# Patient Record
Sex: FEMALE | Race: WHITE | NOT HISPANIC OR LATINO | Employment: PART TIME | URBAN - METROPOLITAN AREA
[De-identification: names, ages, dates, MRNs, and addresses within clinical notes are randomized per-mention and may not be internally consistent; named-entity substitution may affect disease eponyms.]

---

## 2017-02-03 ENCOUNTER — ALLSCRIPTS OFFICE VISIT (OUTPATIENT)
Dept: OTHER | Facility: OTHER | Age: 47
End: 2017-02-03

## 2017-02-03 DIAGNOSIS — Z00.00 ENCOUNTER FOR GENERAL ADULT MEDICAL EXAMINATION WITHOUT ABNORMAL FINDINGS: ICD-10-CM

## 2017-03-17 ENCOUNTER — HOSPITAL ENCOUNTER (OUTPATIENT)
Dept: BONE DENSITY | Facility: IMAGING CENTER | Age: 47
Discharge: HOME | End: 2017-03-17
Payer: COMMERCIAL

## 2017-03-17 ENCOUNTER — GENERIC CONVERSION - ENCOUNTER (OUTPATIENT)
Dept: OTHER | Facility: OTHER | Age: 47
End: 2017-03-17

## 2017-03-17 DIAGNOSIS — Z00.00 ENCOUNTER FOR GENERAL ADULT MEDICAL EXAMINATION WITHOUT ABNORMAL FINDINGS: ICD-10-CM

## 2017-03-17 PROCEDURE — G0202 SCR MAMMO BI INCL CAD: HCPCS

## 2017-10-11 ENCOUNTER — GENERIC CONVERSION - ENCOUNTER (OUTPATIENT)
Dept: OTHER | Facility: OTHER | Age: 47
End: 2017-10-11

## 2017-10-11 ENCOUNTER — ALLSCRIPTS OFFICE VISIT (OUTPATIENT)
Dept: OTHER | Facility: OTHER | Age: 47
End: 2017-10-11

## 2017-10-11 DIAGNOSIS — R10.13 EPIGASTRIC PAIN: ICD-10-CM

## 2017-10-11 DIAGNOSIS — R74.8 ABNORMAL LEVELS OF OTHER SERUM ENZYMES: ICD-10-CM

## 2017-10-30 ENCOUNTER — HOSPITAL ENCOUNTER (OUTPATIENT)
Dept: RADIOLOGY | Facility: HOSPITAL | Age: 47
Discharge: HOME | End: 2017-10-30
Payer: COMMERCIAL

## 2017-10-30 DIAGNOSIS — R10.13 EPIGASTRIC PAIN: ICD-10-CM

## 2017-10-30 LAB
A/G RATIO (HISTORICAL): 2 (CALC) (ref 1–2.5)
ALBUMIN SERPL BCP-MCNC: 4.4 G/DL (ref 3.6–5.1)
ALP SERPL-CCNC: 69 U/L (ref 33–115)
ALT SERPL W P-5'-P-CCNC: 41 U/L (ref 6–29)
AST SERPL W P-5'-P-CCNC: 33 U/L (ref 10–35)
BILIRUB SERPL-MCNC: 0.3 MG/DL (ref 0.2–1.2)
BILIRUBIN DIRECT (HISTORICAL): 0.1 MG/DL
GAMMA GLOBULIN (HISTORICAL): 2.2 G/DL (CALC) (ref 1.9–3.7)
GGT (HISTORICAL): 124 U/L (ref 3–55)
INDIRECT BILIRUBIN (HISTORICAL): 0.2 MG/DL (CALC) (ref 0.2–1.2)
TOTAL PROTEIN (HISTORICAL): 6.6 G/DL (ref 6.1–8.1)

## 2017-10-30 PROCEDURE — 76705 ECHO EXAM OF ABDOMEN: CPT

## 2017-11-10 ENCOUNTER — GENERIC CONVERSION - ENCOUNTER (OUTPATIENT)
Dept: OTHER | Facility: OTHER | Age: 47
End: 2017-11-10

## 2018-01-12 NOTE — RESULT NOTES
Verified Results  (1) HEPATIC FUNCTION PANEL 30Oct2017 08:03AM Bernardino Lynch   REPORT COMMENT:  FASTING:YES     Test Name Result Flag Reference   PROTEIN, TOTAL 6 6 g/dL  6 1-8 1   ALBUMIN 4 4 g/dL  3 6-5 1   GLOBULIN 2 2 g/dL (calc)  1 9-3 7   ALBUMIN/GLOBULIN RATIO 2 0 (calc)  1 0-2 5   BILIRUBIN, TOTAL 0 3 mg/dL  0 2-1 2   BILIRUBIN, DIRECT 0 1 mg/dL  < OR = 0 2   BILIRUBIN, INDIRECT 0 2 mg/dL (calc)  0 2-1 2   ALKALINE PHOSPHATASE 69 U/L     AST 33 U/L  10-35   ALT 41 U/L H 6-29     (1) GGT [id number] 08:03AM Bernardino Lynch     Test Name Result Flag Reference    U/L H 3-55     US LIVER 30Oct2017 07:10AM Jayshree Fregoso Order Number: [id number]    - Patient Instructions: To schedule this appointment, please contact Central Scheduling at [phone]  Test Name Result Flag Reference   US LIVER (Report)     RIGHT UPPER QUADRANT ULTRASOUND     INDICATION: Elevated liver enzymes  COMPARISON: None     TECHNIQUE:  Real-time ultrasound of the right upper quadrant was performed with a curvilinear transducer with both volumetric sweeps and still imaging techniques  FINDINGS:     PANCREAS: Visualized portions of the pancreas are within normal limits  AORTA AND IVC: Visualized portions are normal for patient age  LIVER:   Size: Within normal range  The liver measures 13 2 cm in the midclavicular line  Contour: Surface contour is smooth  Parenchyma: Echogenicity and echotexture are within normal limits  No evidence of suspicious mass  Limited imaging of the main portal vein shows it to be patent and hepatopetal       BILIARY:   The gallbladder is normal in caliber  No wall thickening or pericholecystic fluid  No stones or sludge identified  There is a 6 mm polyp seen  No sonographic Israel's sign  No intrahepatic biliary dilatation  CBD measures 4 mm  No choledocholithiasis  KIDNEY:    Right kidney measures 12 1 x 5 0 cm  Within normal limits       ASCITES: None        IMPRESSION:     6 mm gallbladder polyp  According to literature guidelines (Gerardine Fleischer 2013;10:953-956) small polyps this size are benign and no workup or followup is needed  Otherwise, unremarkable right upper quadrant ultrasound  Workstation performed: [id number]     Signed by:   Samia Nieves DO   10/31/17       Plan  Abnormal GGT test    · (1) GGT; Status:Active; Requested for:[id number];    · (1) HEPATIC FUNCTION PANEL; Status:Active;  Requested for:[id number];

## 2018-01-14 NOTE — RESULT NOTES
Verified Results  * MAMMO SCREENING BILATERAL W CAD [id number] 01:39PM Jose Raul Gilliam Order Number: [id number]    - Patient Instructions: To schedule this appointment, please contact Central Scheduling at [phone]  Do not wear any perfume, powder, lotion or deodorant on breast or underarm area  Please bring your doctors order, referral (if needed) and insurance information with you on the day of the test  Failure to bring this information may result in this test being rescheduled  Arrive 15 minutes prior to your appointment time to register  On the day of your test, please bring any prior mammogram or breast studies with you that were not performed at a [facility]  Failure to bring prior exams may result in your test needing to be rescheduled  Test Name Result Flag Reference   MAMMO SCREENING BILATERAL W CAD (Report)     Patient History:   Patient has history of other cancer at age 50  Family history of prostate cancer at age 79 in paternal    grandfather  Took hormonal contraceptives for 5 years  Patient has never smoked  Patient's BMI is 30 9  Reason for exam: screening, asymptomatic  Mammo Screening Bilateral W CAD: March 17, 2017 - Check In #:    [de-identified]   Bilateral MLO, CC, and XCCL view(s) were taken  Technologist: RERE Carpenter (R)(M)   Prior study comparison: May 22, 2015, bilateral OPMA digital scrn   mammo w/CAD performed at [address] [place] [address]  The breast tissue is heterogeneously dense, potentially limiting    the sensitivity of mammography  Patient risk, included in this    report, assists in determining the appropriate screening regimen    (such as 3-D mammography or the inclusion of automated breast    ultrasound or MRI)  3-D mammography may also remain indicated as    screening  The parenchymal pattern appears stable  No dominant soft tissue    mass or suspicious calcifications are noted   The skin and nipple contours are within normal limits  No mammographic evidence of malignancy  No    significant changes when compared with prior studies  ACR BI-RADSï¾® Assessments: BiRad:1 - Negative     Recommendation:   Routine screening mammogram in 1 year  A reminder letter will be   scheduled  Analyzed by CAD     8-10% of cancers will be missed on mammography  Management of a    palpable abnormality must be based on clinical grounds  Patients   will be notified of their results via letter from our facility  Accredited by [employer] of [employer] and FDA       Transcription Location: [facility] 98: [id number]     Risk Value(s):   Tyrer-Cuzick 10 Year: 2 200%, Tyrer-Cuzick Lifetime: 10 600%,    Myriad Table: 1 5%, RIP 5 Year: 1 0%, NCI Lifetime: 10 2%   Signed by:   Moshe Elizondo MD   3/17/17

## 2018-01-15 VITALS
BODY MASS INDEX: 30.16 KG/M2 | SYSTOLIC BLOOD PRESSURE: 120 MMHG | HEIGHT: 65 IN | TEMPERATURE: 99.2 F | WEIGHT: 181 LBS | DIASTOLIC BLOOD PRESSURE: 80 MMHG

## 2018-01-22 VITALS
WEIGHT: 182 LBS | DIASTOLIC BLOOD PRESSURE: 70 MMHG | HEIGHT: 65 IN | SYSTOLIC BLOOD PRESSURE: 126 MMHG | BODY MASS INDEX: 30.32 KG/M2

## 2018-02-05 DIAGNOSIS — M54.2 CERVICALGIA: Primary | ICD-10-CM

## 2018-02-05 RX ORDER — ALPRAZOLAM 0.25 MG/1
TABLET ORAL
COMMUNITY
Start: 2016-03-04 | End: 2018-03-30 | Stop reason: ALTCHOICE

## 2018-02-05 RX ORDER — CYCLOBENZAPRINE HCL 10 MG
1 TABLET ORAL 3 TIMES DAILY PRN
COMMUNITY
Start: 2011-11-10 | End: 2018-02-05 | Stop reason: SDUPTHER

## 2018-02-05 RX ORDER — CYCLOBENZAPRINE HCL 10 MG
10 TABLET ORAL 3 TIMES DAILY PRN
Qty: 90 TABLET | Refills: 1 | Status: SHIPPED | OUTPATIENT
Start: 2018-02-05 | End: 2018-04-26 | Stop reason: SDUPTHER

## 2018-02-05 RX ORDER — NABUMETONE 750 MG/1
1 TABLET, FILM COATED ORAL 2 TIMES DAILY PRN
COMMUNITY
Start: 2014-12-18 | End: 2018-09-18 | Stop reason: SDUPTHER

## 2018-03-30 ENCOUNTER — OFFICE VISIT (OUTPATIENT)
Dept: FAMILY MEDICINE CLINIC | Facility: CLINIC | Age: 48
End: 2018-03-30
Payer: COMMERCIAL

## 2018-03-30 VITALS
WEIGHT: 182 LBS | DIASTOLIC BLOOD PRESSURE: 80 MMHG | BODY MASS INDEX: 31.07 KG/M2 | HEIGHT: 64 IN | SYSTOLIC BLOOD PRESSURE: 118 MMHG

## 2018-03-30 DIAGNOSIS — M79.661 RIGHT CALF PAIN: Primary | ICD-10-CM

## 2018-03-30 DIAGNOSIS — C43.62 MALIGNANT MELANOMA OF LEFT UPPER EXTREMITY INCLUDING SHOULDER (HCC): ICD-10-CM

## 2018-03-30 DIAGNOSIS — R74.8 ABNORMAL GGT TEST: ICD-10-CM

## 2018-03-30 DIAGNOSIS — F41.9 ANXIETY: ICD-10-CM

## 2018-03-30 PROBLEM — R10.13 EPIGASTRIC PAIN: Status: ACTIVE | Noted: 2017-10-11

## 2018-03-30 PROCEDURE — 99214 OFFICE O/P EST MOD 30 MIN: CPT | Performed by: FAMILY MEDICINE

## 2018-03-30 NOTE — ASSESSMENT & PLAN NOTE
Patient has had some intermittent right calf pain for approximately 1 week  The pain is not persistent  Blue sign is negative and there is no cord palpable  She does have some mild tenderness in a deep medial calf  She has no erythema and no edema  She has no popliteal findings, no posterior thigh findings and no chest pain shortness of breath X cetera  I told her that I suspect that it is a muscular strain  She has no findings that would suggest that she has DVT  I did note to her that clinical examination is not 100% effective in detecting DVT  I did offer her noninvasive testing to rule is out today  She states that she prefer to wait over the weekend  If she continues to have symptoms or certainly if the increase she will call on Monday so that we can arrange for an ultrasound of the calf for her  She will seek more urgent medical attention if she would develop chest pain shortness of breath X cetera which I believe is highly unlikely  She is in agreement with this approach

## 2018-03-30 NOTE — PROGRESS NOTES
Assessment/Plan:  Melanoma ([facility] [address] )  Dr Ana Rosa Ford following  Right calf pain  Patient has had some intermittent right calf pain for approximately 1 week  The pain is not persistent  Blue sign is negative and there is no cord palpable  She does have some mild tenderness in a deep medial calf  She has no erythema and no edema  She has no popliteal findings, no posterior thigh findings and no chest pain shortness of breath X cetera  I told her that I suspect that it is a muscular strain  She has no findings that would suggest that she has DVT  I did note to her that clinical examination is not 100% effective in detecting DVT  I did offer her noninvasive testing to rule is out today  She states that she prefer to wait over the weekend  If she continues to have symptoms or certainly if the increase she will call on Monday so that we can arrange for an ultrasound of the calf for her  She will seek more urgent medical attention if she would develop chest pain shortness of breath X cetera which I believe is highly unlikely  She is in agreement with this approach  Abnormal GGT test  She has LFT abnormality by history  She has cut back on alcohol as well as anti-inflammatories and cyclobenzaprine  We are going to have her go for repeat liver function tests follow up with her when results are available  She agrees  Neck pain  Patient has chronic myofascial neck pain  She continues with cyclobenzaprine and Relafen  Diagnoses and all orders for this visit:    Right calf pain  -     VAS lower limb venous duplex study, unilateral/limited; Future    Abnormal GGT test    Anxiety    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)          Subjective:   Chief Complaint   Patient presents with    Leg Pain     Right lower  (Calf)   About a week of R calf pain described as a bruise  No lump  No trauma  No new activity  No edema, CP or Sob, fever,   Due for LFTs for elevation   Less ETOH, probiotis as well as less cyclobenzaprine and Relafen  Patient ID: Melissa Xie is a 50 y o  female  HPI  The patient is a 53-year-old female who is had right calf pain for approximately 1 week  She describes it is a bruised feeling  She has had no palpable mass and she recalls no trauma  She has had no overuse activity  She has no edema no erythema and no chest pain shortness of breath, fever, presyncope X cetera  She also states that she is due for her liver function repeat  She has been drinking less alcohol taking less cyclobenzaprine and Relafen and trying to take a cleansing mixture that she obtained from the [place]  The following portions of the patient's history were reviewed and updated as appropriate: allergies, current medications, past family history, past medical history, past social history, past surgical history and problem list     Review of Systems   Constitution: Negative for decreased appetite, fever, weight gain and weight loss  Cardiovascular: Negative for chest pain, irregular heartbeat and leg swelling  Respiratory: Negative for shortness of breath and wheezing  Skin: Positive for skin cancer  Negative for rash  She has history of melanoma resection from left upper lateral arm  Musculoskeletal: Positive for myalgias  Negative for back pain and muscle cramps  Calf pain as noted in HPI  No radicular symptoms  Objective:    Physical Exam   Constitutional: She is oriented to person, place, and time  She appears well-developed and well-nourished  Neck: No JVD present  Cardiovascular: Normal rate, regular rhythm and normal heart sounds  Exam reveals no gallop  No murmur heard  Pulmonary/Chest: Effort normal and breath sounds normal  No respiratory distress  She has no wheezes  She has no rales  Musculoskeletal: She exhibits no edema  Calf diameter is essentially equal bilaterally  Her right calf is 18 cm, left calf 18-1/4 cm  There is no palpable cord, she has no Blue sign though there is some mild medial deep tenderness  There is no erythema, popliteal fossa and thigh are normal    Neurological: She is alert and oriented to person, place, and time  Skin: No rash noted  No erythema  Psychiatric: She has a normal mood and affect   Her behavior is normal

## 2018-03-30 NOTE — ASSESSMENT & PLAN NOTE
She has LFT abnormality by history  She has cut back on alcohol as well as anti-inflammatories and cyclobenzaprine  We are going to have her go for repeat liver function tests follow up with her when results are available  She agrees

## 2018-04-12 ENCOUNTER — TRANSCRIBE ORDERS (OUTPATIENT)
Dept: ADMINISTRATIVE | Facility: HOSPITAL | Age: 48
End: 2018-04-12

## 2018-04-12 DIAGNOSIS — Z12.39 SCREENING BREAST EXAMINATION: Primary | ICD-10-CM

## 2018-04-17 ENCOUNTER — OFFICE VISIT (OUTPATIENT)
Dept: FAMILY MEDICINE CLINIC | Facility: CLINIC | Age: 48
End: 2018-04-17
Payer: COMMERCIAL

## 2018-04-17 VITALS — TEMPERATURE: 97.9 F | SYSTOLIC BLOOD PRESSURE: 146 MMHG | DIASTOLIC BLOOD PRESSURE: 86 MMHG
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
## 2018-04-17 DIAGNOSIS — M79.661 RIGHT CALF PAIN: ICD-10-CM

## 2018-04-17 PROCEDURE — 99213 OFFICE O/P EST LOW 20 MIN: CPT | Performed by: FAMILY MEDICINE

## 2018-04-17 RX ORDER — DOXYCYCLINE HYCLATE 100 MG/1
100 CAPSULE ORAL EVERY 12 HOURS SCHEDULED
Qty: 20 CAPSULE | Refills: 0 | Status: SHIPPED | OUTPATIENT
Start: 2018-04-17 | End: 2018-04-27

## 2018-04-17 NOTE — ASSESSMENT & PLAN NOTE
She had a fleeting recurrence of her symptoms today  Today examination is completely normal   She is going to continue to observe

## 2018-04-17 NOTE — PROGRESS NOTES
Assessment/Plan:  Tick bite  for surePatient suffered to deer tick bites over the weekend  She is concerned that the rash was in ECM  I do not believe that this is the case but we will treat her with doxycycline 100 mg b i d  times 10 days empirically  She will call should she develop any fevers chills constitutional symptoms X cetera  She agrees  Right calf pain  She had a fleeting recurrence of her symptoms today  Today examination is completely normal   She is going to continue to observe  Diagnoses and all orders for this visit:    Tick bite, initial encounter  -     doxycycline hyclate (VIBRAMYCIN) 100 mg capsule; Take 1 capsule (100 mg total) by mouth every 12 (twelve) hours for 10 days    Right calf pain          Subjective:   Chief Complaint   Patient presents with    Rash     Bull's Eye     I had 2 tick bites this Saturday  Pulled one off back and developed a BE rash  Noted rash yesterday  No associated sx  Patient ID: Delicia Hubbard is a 50 y o  female  HPI  Patient is a 49-year-old female who presents today stating that she removed 2 deer ticks this weekend  She was out hiking on Saturday with her dog  Sunday she noted in irritated area around her right bra line, lateral thorax  She removed a tick which sounds to be an adult deer tick bite description  She also noted a tick which sounds like a nymph of her right medial upper arm  They removed without difficulty but by yesterday she noted irritation especially around the right lateral thorax site  Today she noted it to be bruised and swollen and irritated  She has had no fevers chills or constitutional symptoms  No joint aches, no headaches no other rashes  She also requests mammogram slip today  She also reports recurrence of right calf pain which was temporary and resolved  There was no swelling of the calf no redness no chest pain shortness of breath X cetera  No pedal edema    The following portions of the patient's history were reviewed and updated as appropriate: allergies, current medications, past medical history, past social history and problem list     ROS    See HPI for review of systems  Objective:    Physical Exam   Constitutional: She appears well-developed and well-nourished  Somewhat overweight in no apparent distress   Neck: No JVD present  Cardiovascular: Normal rate and regular rhythm  Exam reveals no gallop  No murmur heard  Pulmonary/Chest: Effort normal and breath sounds normal  No respiratory distress  She has no wheezes  She has no rales  Musculoskeletal: She exhibits no edema or tenderness  Examination for calf reveals no evidence of Blue sign, no cord no erythema edema X cetera  Skin exam is normal, sensation is normal, muscular function including peroneal muscular strength is normal    Lymphadenopathy:     She has no cervical adenopathy  Skin: No rash noted  There is erythema  Right lateral thorax reveals a 2 mm scabbed area which is dark in color  It is surrounded by erythematous area as well as some ecchymoses superiorly approximately 1 x 2 cm  There is no drainage  It does not appear to be an ECM  Right medial upper arm reveals a small papule with some minimal surrounding erythema  This also is not an ECM   Psychiatric: She has a normal mood and affect

## 2018-04-17 NOTE — ASSESSMENT & PLAN NOTE
for surePatient suffered to deer tick bites over the weekend  She is concerned that the rash was in ECM  I do not believe that this is the case but we will treat her with doxycycline 100 mg b i d  times 10 days empirically  She will call should she develop any fevers chills constitutional symptoms X cetera  She agrees

## 2018-04-24 ENCOUNTER — HOSPITAL ENCOUNTER (OUTPATIENT)
Dept: ULTRASOUND IMAGING | Facility: CLINIC | Age: 48
Discharge: HOME | End: 2018-04-24
Attending: FAMILY MEDICINE
Payer: COMMERCIAL

## 2018-04-24 DIAGNOSIS — M79.661 RIGHT CALF PAIN: ICD-10-CM

## 2018-04-24 PROCEDURE — 93971 EXTREMITY STUDY: CPT

## 2018-04-24 PROCEDURE — 93971 EXTREMITY STUDY: CPT | Performed by: SURGERY

## 2018-04-26 DIAGNOSIS — M54.2 CERVICALGIA: ICD-10-CM

## 2018-04-26 RX ORDER — CYCLOBENZAPRINE HCL 10 MG
10 TABLET ORAL 3 TIMES DAILY PRN
Qty: 90 TABLET | Refills: 0 | Status: SHIPPED | OUTPATIENT
Start: 2018-04-26 | End: 2018-05-30 | Stop reason: SDUPTHER

## 2018-05-07 ENCOUNTER — HOSPITAL ENCOUNTER (OUTPATIENT)
Dept: BONE DENSITY | Facility: IMAGING CENTER | Age: 48
Discharge: HOME | End: 2018-05-07
Payer: COMMERCIAL

## 2018-05-07 DIAGNOSIS — Z12.39 SCREENING BREAST EXAMINATION: ICD-10-CM

## 2018-05-07 PROCEDURE — 77067 SCR MAMMO BI INCL CAD: CPT

## 2018-05-30 DIAGNOSIS — M54.2 CERVICALGIA: ICD-10-CM

## 2018-05-30 RX ORDER — CYCLOBENZAPRINE HCL 10 MG
TABLET ORAL
Qty: 90 TABLET | Refills: 0 | Status: SHIPPED | OUTPATIENT
Start: 2018-05-30 | End: 2018-09-05 | Stop reason: SDUPTHER

## 2018-07-01 DIAGNOSIS — M54.2 CERVICALGIA: ICD-10-CM

## 2018-07-02 RX ORDER — CYCLOBENZAPRINE HCL 10 MG
TABLET ORAL
Qty: 90 TABLET | Refills: 0 | OUTPATIENT
Start: 2018-07-02

## 2018-07-14 LAB
ALBUMIN SERPL-MCNC: 4.6 G/DL (ref 3.6–5.1)
ALBUMIN/GLOB SERPL: 2 (CALC) (ref 1–2.5)
ALP SERPL-CCNC: 86 U/L (ref 33–115)
ALT SERPL-CCNC: 31 U/L (ref 6–29)
AST SERPL-CCNC: 23 U/L (ref 10–35)
BILIRUB DIRECT SERPL-MCNC: 0.1 MG/DL
BILIRUB INDIRECT SERPL-MCNC: 0.3 MG/DL (CALC) (ref 0.2–1.2)
BILIRUB SERPL-MCNC: 0.4 MG/DL (ref 0.2–1.2)
GGT SERPL-CCNC: 208 U/L (ref 3–55)
GLOBULIN SER CALC-MCNC: 2.3 G/DL (CALC) (ref 1.9–3.7)
PROT SERPL-MCNC: 6.9 G/DL (ref 6.1–8.1)

## 2018-08-10 ENCOUNTER — OFFICE VISIT (OUTPATIENT)
Dept: FAMILY MEDICINE CLINIC | Facility: CLINIC | Age: 48
End: 2018-08-10
Payer: COMMERCIAL

## 2018-08-10 VITALS
DIASTOLIC BLOOD PRESSURE: 80 MMHG | TEMPERATURE: 98.3 F | SYSTOLIC BLOOD PRESSURE: 120 MMHG | HEART RATE: 87 BPM | OXYGEN SATURATION: 98 % | HEIGHT: 65 IN | BODY MASS INDEX: 30.82 KG/M2 | WEIGHT: 185 LBS

## 2018-08-10 DIAGNOSIS — Z13.1 SCREENING FOR DIABETES MELLITUS: ICD-10-CM

## 2018-08-10 DIAGNOSIS — H60.332 SWIMMER'S EAR OF LEFT SIDE, UNSPECIFIED CHRONICITY: ICD-10-CM

## 2018-08-10 DIAGNOSIS — Z13.0 SCREENING FOR IRON DEFICIENCY ANEMIA: Primary | ICD-10-CM

## 2018-08-10 DIAGNOSIS — Z13.220 SCREENING, LIPID: ICD-10-CM

## 2018-08-10 DIAGNOSIS — R74.8 ABNORMAL GGT TEST: ICD-10-CM

## 2018-08-10 DIAGNOSIS — M54.2 NECK PAIN: ICD-10-CM
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
## 2018-08-10 PROBLEM — M79.661 RIGHT CALF PAIN: Status: RESOLVED | Noted: 2018-03-30 | Resolved: 2018-08-10

## 2018-08-10 PROCEDURE — 99214 OFFICE O/P EST MOD 30 MIN: CPT | Performed by: FAMILY MEDICINE

## 2018-08-10 NOTE — PROGRESS NOTES
Assessment/Plan:  Abnormal GGT test  The patient's recent LFTs revealed that her GGT has actually increased again to 204 which is not quite 4 times normal   The rest of her transaminases, alkaline phosphatase and bilirubin are normal   She did have a ultrasound of the liver in the fall which was essentially normal   She had a 6 mm gallbladder polyp which was felt to be benign based on current radiographic criteria  She has no constitutional symptoms  No symptoms attributed to the GI tract  She has cut back significantly on her use of anti-inflammatories and alcohol as well as cyclobenzaprine  We discussed this today  We decided to have her completely eliminate alcohol as well as anti-inflammatories for the next month  She will continue with yoga and other options for chronic neck pain  If her GGT elevation persists we are going to refer her to Gastroenterology for further evaluation  She agrees  We did review her old chart she did have a C-spine series in 2003 which revealed showed some reversal of curve but no other abnormality  Swimmer's ear of left side  She appears to have a mild to moderate left otitis externa  She has been scuba diving a lot recently  We are going to give her some Cortisporin otic suspension to use for the next few days  She is going to call for symptoms do not resolve relatively quickly  Diagnoses and all orders for this visit:    Screening for iron deficiency anemia  -     CBC; Future  -     CBC    Abnormal GGT test  -     Gamma GT; Future  -     Gamma GT    Neck pain    Swimmer's ear of left side, unspecified chronicity  -     neomycin-polymyxin-hydrocortisone (CORTISPORIN) otic solution; Administer 3 drops into the left ear every 6 (six) hours    Screening for diabetes mellitus  -     Comprehensive metabolic panel; Future  -     Comprehensive metabolic panel    Screening, lipid  -     Lipid panel;  Future  -     Lipid panel          Subjective:   Chief Complaint Patient presents with    med check     pt here to get refills on her scripts today  [facility]  Patient ID: Neha Tsai is a 50 y o  female  I got some water in my ear  Saw ENT who gave steroids " that made me crazy and I gained 5 pounds " Feels sore after scuba  No drainage  I cut out cyclobenzaprine, alcohol and Relafen  3 ETOH , 2 nights a week  No constitutional sx  Some vasomotor sx  No HRT  Yoga , Pilates, etc    HPI  Patient is a 57-year-old female who presents today for follow-up of multiple medical problems  She has chronic cervicalgia for which she takes cyclobenzaprine as well as Relafen  She states that she has been attending yoga Pilates and exercising and finds that her neck pain has been much improved  She has been cutting back significantly on cyclobenzaprine as well as Relafen  We did note that she had a significant GGT elevation though the rest of her transaminases and alkaline phosphatase as as well as bilirubin were normal   She recently went for follow-up GGT and this we discover that had increased to over 200 with the top limit of normal of 55  In the fall was in the 120 range  Ultrasound the liver at that time was essentially normal with 1 small gallbladder polyp 6 mm felt to be of no consequence  She has also had recent left ear irritation for which she saw otolaryngology  They gave her some oral steroids which she states made her agitated and gait weight  She discontinued them after several days  She has been scuba diving and feels that this may have contributed to it  It is uncomfortable to touch and itchy but she has had no drainage  No loss of hearing  She was drinking significant quantities of alcohol which she states she has cut back significantly to 3 drinks twice a week  She was treated for possible Lyme disease in the spring  She took all of her doxycycline and states that her rash resolved after several weeks    The following portions of the patient's history were reviewed and updated as appropriate: allergies, current medications, past family history, past medical history, past social history, past surgical history and problem list     Review of Systems   Constitution: Negative for decreased appetite, fever and malaise/fatigue  HENT: Positive for ear pain  Negative for congestion, ear discharge, hearing loss and sore throat  Cardiovascular: Negative for chest pain, irregular heartbeat and leg swelling  Respiratory: Negative for cough, hemoptysis and shortness of breath  Musculoskeletal: Positive for neck pain and stiffness  Gastrointestinal: Negative for bloating, abdominal pain, anorexia, constipation, diarrhea, dysphagia, hematochezia, nausea and vomiting  Objective:    Physical Exam   Constitutional: She is oriented to person, place, and time  She appears well-developed and well-nourished  HENT:   Mouth/Throat: No oropharyngeal exudate  Left canal is mildly erythematous with some scale present  TM looks normal without effusion  Eyes: Conjunctivae are normal  No scleral icterus  Neck: Neck supple  No JVD present  No thyromegaly present  Cardiovascular: Normal rate and regular rhythm  No murmur heard  Pulmonary/Chest: Effort normal and breath sounds normal  No respiratory distress  She has no wheezes  Abdominal: Soft  Bowel sounds are normal  She exhibits no mass  There is no tenderness  There is no rebound  Musculoskeletal: She exhibits no edema  Lymphadenopathy:     She has no cervical adenopathy  Neurological: She is alert and oriented to person, place, and time  Psychiatric: She has a normal mood and affect   Thought content normal

## 2018-08-10 NOTE — PATIENT INSTRUCTIONS
Please try to eliminate all alcohol and anti-inflammatories  Please have your blood work repeated in 1 month  If your gamma GT elevation persists we are going to refer you to Gastroenterology for further evaluation

## 2018-08-10 NOTE — ASSESSMENT & PLAN NOTE
She appears to have a mild to moderate left otitis externa  She has been scuba diving a lot recently  We are going to give her some Cortisporin otic suspension to use for the next few days  She is going to call for symptoms do not resolve relatively quickly

## 2018-08-10 NOTE — ASSESSMENT & PLAN NOTE
The patient's recent LFTs revealed that her GGT has actually increased again to 204 which is not quite 4 times normal   The rest of her transaminases, alkaline phosphatase and bilirubin are normal   She did have a ultrasound of the liver in the fall which was essentially normal   She had a 6 mm gallbladder polyp which was felt to be benign based on current radiographic criteria  She has no constitutional symptoms  No symptoms attributed to the GI tract  She has cut back significantly on her use of anti-inflammatories and alcohol as well as cyclobenzaprine  We discussed this today  We decided to have her completely eliminate alcohol as well as anti-inflammatories for the next month  She will continue with yoga and other options for chronic neck pain  If her GGT elevation persists we are going to refer her to Gastroenterology for further evaluation  She agrees  We did review her old chart she did have a C-spine series in 2003 which revealed showed some reversal of curve but no other abnormality

## 2018-09-05 DIAGNOSIS — M54.2 CERVICALGIA: ICD-10-CM

## 2018-09-05 RX ORDER — CYCLOBENZAPRINE HCL 10 MG
10 TABLET ORAL DAILY
Qty: 30 TABLET | Refills: 0 | Status: SHIPPED | OUTPATIENT
Start: 2018-09-05 | End: 2018-09-18 | Stop reason: SDUPTHER

## 2018-09-18 ENCOUNTER — OFFICE VISIT (OUTPATIENT)
Dept: FAMILY MEDICINE CLINIC | Facility: CLINIC | Age: 48
End: 2018-09-18
Payer: COMMERCIAL

## 2018-09-18 VITALS
WEIGHT: 182 LBS | OXYGEN SATURATION: 97 % | HEART RATE: 74 BPM | TEMPERATURE: 97.4 F | SYSTOLIC BLOOD PRESSURE: 140 MMHG | DIASTOLIC BLOOD PRESSURE: 90 MMHG | HEIGHT: 65 IN | BODY MASS INDEX: 30.32 KG/M2

## 2018-09-18 DIAGNOSIS — M54.2 CERVICALGIA: ICD-10-CM

## 2018-09-18 DIAGNOSIS — M79.18: Primary | ICD-10-CM

## 2018-09-18 PROCEDURE — 1036F TOBACCO NON-USER: CPT | Performed by: FAMILY MEDICINE

## 2018-09-18 PROCEDURE — 99214 OFFICE O/P EST MOD 30 MIN: CPT | Performed by: FAMILY MEDICINE

## 2018-09-18 PROCEDURE — 3008F BODY MASS INDEX DOCD: CPT | Performed by: FAMILY MEDICINE

## 2018-09-18 RX ORDER — NABUMETONE 750 MG/1
750 TABLET, FILM COATED ORAL 2 TIMES DAILY PRN
Qty: 60 TABLET | Refills: 0 | Status: SHIPPED | OUTPATIENT
Start: 2018-09-18

## 2018-09-18 RX ORDER — CYCLOBENZAPRINE HCL 10 MG
10 TABLET ORAL DAILY
Qty: 30 TABLET | Refills: 0 | Status: SHIPPED | OUTPATIENT
Start: 2018-09-18 | End: 2018-09-28 | Stop reason: SDUPTHER

## 2018-09-18 NOTE — ASSESSMENT & PLAN NOTE
Today she notes that she had significant alcohol intake over the weekend during a trip to the [facility]  She is going to try to avoid prior to her trip to [facility] and retest her GGT at that time  We did state that if it continues to be elevated I am going to suggest referral to Gastroenterology  She agrees

## 2018-09-18 NOTE — PROGRESS NOTES
Assessment/Plan:  Muscular abdominal pain in periumbilical region  The patient presents today with improving left periumbilical call/lateral abdominal pain  By history examination and appears to be musculoskeletal in etiology  She has no genitourinary or gastrointestinal symptoms associated with it  We have asked her to avoid further overuse exercises for now and begin less strenuous regimen when she resumes  She is asked to call for symptoms do not resolve over the next few days or certainly should they worsen  She agrees  Cervicalgia  She has been using cyclobenzaprine as well as Relafen very sparingly due to her history of GGT elevation  We did refill her prescription today as she is planning on a trip to [facility] next month  Abnormal GGT test  Today she notes that she had significant alcohol intake over the weekend during a trip to the [facility]  She is going to try to avoid prior to her trip to [facility] and retest her GGT at that time  We did state that if it continues to be elevated I am going to suggest referral to Gastroenterology  She agrees  Diagnoses and all orders for this visit:    Muscular abdominal pain in periumbilical region    Cervicalgia  -     cyclobenzaprine (FLEXERIL) 10 mg tablet; Take 1 tablet (10 mg total) by mouth daily  -     nabumetone (RELAFEN) 750 mg tablet; Take 1 tablet (750 mg total) by mouth 2 (two) times a day as needed for mild pain          Subjective:   Chief Complaint   Patient presents with    Abdominal Pain     L sided since saturday  pt states it is slowly getting better  she is due for fasting labs  Patient ID: Anette Pereira is a 50 y o  female  I woke up Saturday and I had pain in my L abd which was worse when I moved  Started Pilates  No change in Gi/ habits, no fever and normal appetite  Seems to be improving  Still using ETOH  HPI  Patient is a 53-year-old female who states that she awoke Saturday and she had left-sided abdominal pain   She notes that when she twisted her moved to decided seemed to worsen  She has had no change in her bowel or bladder habits  She has had no fever  She has had normal appetite  She states that the discomfort seems to be improving  She does note that she has been increasing her physical activity significantly recently in order to lose weight for a trip to [facility] which is upcoming in October  She has been doing Pilates and other exercises which she feels may have caused this abdominal pain  She notes that it is improving presently  The following portions of the patient's history were reviewed and updated as appropriate: allergies, current medications, past family history, past medical history, past social history, past surgical history and problem list     Review of Systems   Constitution: Negative for chills, decreased appetite, fever, weight gain and weight loss  Frustrated over lack of weight loss   Cardiovascular: Negative for chest pain  Respiratory: Negative for cough and shortness of breath  Endocrine: Negative for cold intolerance, heat intolerance, polydipsia, polyphagia and polyuria  Skin: Negative for rash  Gastrointestinal: Positive for abdominal pain  Negative for anorexia, change in bowel habit, bowel incontinence, constipation, diarrhea, dysphagia, excessive appetite, heartburn, hematemesis, hematochezia, melena, nausea and vomiting  Genitourinary: Negative for bladder incontinence, flank pain, frequency, hematuria, hesitancy, pelvic pain and urgency  Psychiatric/Behavioral: Negative for depression and suicidal ideas  The patient is not nervous/anxious  Objective:    Physical Exam   Constitutional: She appears well-developed and well-nourished  No distress  Abdominal: Soft  Bowel sounds are normal  She exhibits no mass  There is tenderness  There is no rebound and no guarding  She has some mild lower left periumbilical tenderness   This appears to be at the margin of the oblique musculature  It does worsen with flexion and rotation of the trunk  There is no mass or peritoneal sign and her bowel sounds are normal presently

## 2018-09-18 NOTE — ASSESSMENT & PLAN NOTE
The patient presents today with improving left periumbilical call/lateral abdominal pain  By history examination and appears to be musculoskeletal in etiology  She has no genitourinary or gastrointestinal symptoms associated with it  We have asked her to avoid further overuse exercises for now and begin less strenuous regimen when she resumes  She is asked to call for symptoms do not resolve over the next few days or certainly should they worsen  She agrees

## 2018-09-18 NOTE — ASSESSMENT & PLAN NOTE
She has been using cyclobenzaprine as well as Relafen very sparingly due to her history of GGT elevation  We did refill her prescription today as she is planning on a trip to [facility] next month

## 2018-09-28 DIAGNOSIS — M54.2 CERVICALGIA: ICD-10-CM

## 2018-09-28 RX ORDER — CYCLOBENZAPRINE HCL 10 MG
10 TABLET ORAL 2 TIMES DAILY PRN
Qty: 60 TABLET | Refills: 0 | Status: SHIPPED | OUTPATIENT
Start: 2018-09-28 | End: 2018-11-01 | Stop reason: SDUPTHER

## 2018-11-01 DIAGNOSIS — M54.2 CERVICALGIA: ICD-10-CM

## 2018-11-01 RX ORDER — CYCLOBENZAPRINE HCL 10 MG
10 TABLET ORAL 2 TIMES DAILY PRN
Qty: 60 TABLET | Refills: 0 | Status: SHIPPED | OUTPATIENT
Start: 2018-11-01 | End: 2018-11-27 | Stop reason: SDUPTHER

## 2018-11-27 DIAGNOSIS — M54.2 CERVICALGIA: ICD-10-CM

## 2018-11-27 RX ORDER — CYCLOBENZAPRINE HCL 10 MG
TABLET ORAL
Qty: 60 TABLET | Refills: 0 | Status: SHIPPED | OUTPATIENT
Start: 2018-11-27 | End: 2018-12-30 | Stop reason: SDUPTHER

## 2018-12-30 DIAGNOSIS — M54.2 CERVICALGIA: ICD-10-CM

## 2018-12-31 RX ORDER — CYCLOBENZAPRINE HCL 10 MG
TABLET ORAL
Qty: 60 TABLET | Refills: 0 | Status: SHIPPED | OUTPATIENT
Start: 2018-12-31 | End: 2019-01-29 | Stop reason: SDUPTHER

## 2019-01-29 DIAGNOSIS — M54.2 CERVICALGIA: ICD-10-CM

## 2019-01-29 RX ORDER — CYCLOBENZAPRINE HCL 10 MG
10 TABLET ORAL 2 TIMES DAILY PRN
Qty: 60 TABLET | Refills: 0 | Status: SHIPPED | OUTPATIENT
Start: 2019-01-29 | End: 2019-02-25 | Stop reason: SDUPTHER

## 2019-02-25 DIAGNOSIS — M54.2 CERVICALGIA: ICD-10-CM

## 2019-02-26 RX ORDER — CYCLOBENZAPRINE HCL 10 MG
TABLET ORAL
Qty: 60 TABLET | Refills: 0 | Status: SHIPPED | OUTPATIENT
Start: 2019-02-26 | End: 2019-03-28 | Stop reason: SDUPTHER

## 2019-03-28 DIAGNOSIS — M54.2 CERVICALGIA: ICD-10-CM

## 2019-03-28 RX ORDER — CYCLOBENZAPRINE HCL 10 MG
10 TABLET ORAL 2 TIMES DAILY PRN
Qty: 60 TABLET | Refills: 0 | Status: SHIPPED | OUTPATIENT
Start: 2019-03-28 | End: 2019-04-24 | Stop reason: SDUPTHER

## 2019-04-05 ENCOUNTER — OFFICE VISIT (OUTPATIENT)
Dept: FAMILY MEDICINE CLINIC | Facility: CLINIC | Age: 49
End: 2019-04-05
Payer: COMMERCIAL

## 2019-04-05 VITALS
RESPIRATION RATE: 18 BRPM | WEIGHT: 184.5 LBS | HEART RATE: 76 BPM | TEMPERATURE: 98 F | DIASTOLIC BLOOD PRESSURE: 80 MMHG | HEIGHT: 64 IN | SYSTOLIC BLOOD PRESSURE: 124 MMHG | BODY MASS INDEX: 31.5 KG/M2

## 2019-04-05 DIAGNOSIS — K64.4 EXTERNAL HEMORRHOID: ICD-10-CM

## 2019-04-05 DIAGNOSIS — Z12.11 SCREENING FOR MALIGNANT NEOPLASM OF COLON: ICD-10-CM

## 2019-04-05 DIAGNOSIS — Z12.39 SCREENING FOR MALIGNANT NEOPLASM OF BREAST: ICD-10-CM

## 2019-04-05 DIAGNOSIS — B37.9 CANDIDA INFECTION: Primary | ICD-10-CM

## 2019-04-05 PROCEDURE — 1036F TOBACCO NON-USER: CPT | Performed by: PHYSICIAN ASSISTANT

## 2019-04-05 PROCEDURE — 99213 OFFICE O/P EST LOW 20 MIN: CPT | Performed by: PHYSICIAN ASSISTANT

## 2019-04-05 PROCEDURE — 3008F BODY MASS INDEX DOCD: CPT | Performed by: PHYSICIAN ASSISTANT

## 2019-04-05 RX ORDER — NYSTATIN 100000 U/G
CREAM TOPICAL 2 TIMES DAILY
Qty: 30 G | Refills: 1 | Status: SHIPPED | OUTPATIENT
Start: 2019-04-05 | End: 2019-06-07 | Stop reason: SDUPTHER

## 2019-04-24 DIAGNOSIS — M54.2 CERVICALGIA: ICD-10-CM

## 2019-04-24 RX ORDER — CYCLOBENZAPRINE HCL 10 MG
TABLET ORAL
Qty: 60 TABLET | Refills: 0 | Status: SHIPPED | OUTPATIENT
Start: 2019-04-24 | End: 2019-05-07 | Stop reason: SDUPTHER

## 2019-05-07 ENCOUNTER — OFFICE VISIT (OUTPATIENT)
Dept: FAMILY MEDICINE CLINIC | Facility: CLINIC | Age: 49
End: 2019-05-07
Payer: COMMERCIAL

## 2019-05-07 VITALS
SYSTOLIC BLOOD PRESSURE: 138 MMHG | WEIGHT: 183 LBS | DIASTOLIC BLOOD PRESSURE: 86 MMHG | HEIGHT: 64 IN | BODY MASS INDEX: 31.24 KG/M2

## 2019-05-07 DIAGNOSIS — F41.9 ANXIETY: ICD-10-CM

## 2019-05-07 DIAGNOSIS — R74.8 ABNORMAL GGT TEST: ICD-10-CM

## 2019-05-07 DIAGNOSIS — M54.2 CERVICALGIA: Primary | ICD-10-CM

## 2019-05-07 DIAGNOSIS — Z13.1 SCREENING FOR DIABETES MELLITUS: ICD-10-CM

## 2019-05-07 DIAGNOSIS — Z13.220 SCREENING FOR LIPID DISORDERS: ICD-10-CM

## 2019-05-07 DIAGNOSIS — Z13.0 SCREENING FOR DEFICIENCY ANEMIA: ICD-10-CM

## 2019-05-07 PROBLEM — M79.18: Status: RESOLVED | Noted: 2018-09-18 | Resolved: 2019-05-07

## 2019-05-07 PROBLEM — R10.13 EPIGASTRIC PAIN: Status: RESOLVED | Noted: 2017-10-11 | Resolved: 2019-05-07

## 2019-05-07 PROBLEM — H60.332 SWIMMER'S EAR OF LEFT SIDE: Status: RESOLVED | Noted: 2018-08-10 | Resolved: 2019-05-07

## 2019-05-07 PROCEDURE — 1036F TOBACCO NON-USER: CPT | Performed by: FAMILY MEDICINE

## 2019-05-07 PROCEDURE — 3008F BODY MASS INDEX DOCD: CPT | Performed by: FAMILY MEDICINE

## 2019-05-07 PROCEDURE — 99214 OFFICE O/P EST MOD 30 MIN: CPT | Performed by: FAMILY MEDICINE

## 2019-05-07 RX ORDER — CYCLOBENZAPRINE HCL 10 MG
10 TABLET ORAL 2 TIMES DAILY PRN
Qty: 60 TABLET | Refills: 5 | Status: SHIPPED | OUTPATIENT
Start: 2019-05-07 | End: 2019-10-29 | Stop reason: SDUPTHER

## 2019-05-07 RX ORDER — ALPRAZOLAM 0.25 MG/1
0.25 TABLET ORAL
Qty: 30 TABLET | Refills: 0 | Status: SHIPPED | OUTPATIENT
Start: 2019-05-07 | End: 2019-06-07 | Stop reason: SDUPTHER

## 2019-05-08 LAB
ALBUMIN SERPL-MCNC: 4.5 G/DL (ref 3.6–5.1)
ALBUMIN/GLOB SERPL: 1.9 (CALC) (ref 1–2.5)
ALP SERPL-CCNC: 69 U/L (ref 33–130)
ALT SERPL-CCNC: 28 U/L (ref 6–29)
AST SERPL-CCNC: 20 U/L (ref 10–35)
BILIRUB SERPL-MCNC: 0.6 MG/DL (ref 0.2–1.2)
BUN SERPL-MCNC: 8 MG/DL (ref 7–25)
BUN/CREAT SERPL: NORMAL (CALC) (ref 6–22)
CALCIUM SERPL-MCNC: 9.5 MG/DL (ref 8.6–10.4)
CHLORIDE SERPL-SCNC: 103 MMOL/L (ref 98–110)
CHOLEST SERPL-MCNC: 189 MG/DL
CHOLEST/HDLC SERPL: 2.2 (CALC)
CO2 SERPL-SCNC: 26 MMOL/L (ref 20–32)
CREAT SERPL-MCNC: 0.72 MG/DL (ref 0.5–1.05)
ERYTHROCYTE [DISTWIDTH] IN BLOOD BY AUTOMATED COUNT: 12.6 % (ref 11–15)
GGT SERPL-CCNC: 107 U/L (ref 3–70)
GLOBULIN SER CALC-MCNC: 2.4 G/DL (CALC) (ref 1.9–3.7)
GLUCOSE SERPL-MCNC: 98 MG/DL (ref 65–99)
HCT VFR BLD AUTO: 43 % (ref 35–45)
HDLC SERPL-MCNC: 87 MG/DL
HGB BLD-MCNC: 13.9 G/DL (ref 11.7–15.5)
LDLC SERPL CALC-MCNC: 83 MG/DL (CALC)
MCH RBC QN AUTO: 30.1 PG (ref 27–33)
MCHC RBC AUTO-ENTMCNC: 32.3 G/DL (ref 32–36)
MCV RBC AUTO: 93.1 FL (ref 80–100)
NONHDLC SERPL-MCNC: 102 MG/DL (CALC)
PLATELET # BLD AUTO: 263 THOUSAND/UL (ref 140–400)
PMV BLD REES-ECKER: 10.3 FL (ref 7.5–12.5)
POTASSIUM SERPL-SCNC: 4.1 MMOL/L (ref 3.5–5.3)
PROT SERPL-MCNC: 6.9 G/DL (ref 6.1–8.1)
RBC # BLD AUTO: 4.62 MILLION/UL (ref 3.8–5.1)
SL AMB EGFR AFRICAN AMERICAN: 113 ML/MIN/1.73M2
SL AMB EGFR NON AFRICAN AMERICAN: 98 ML/MIN/1.73M2
SODIUM SERPL-SCNC: 139 MMOL/L (ref 135–146)
TRIGL SERPL-MCNC: 94 MG/DL
WBC # BLD AUTO: 5 THOUSAND/UL (ref 3.8–10.8)

## 2019-06-07 DIAGNOSIS — B37.9 CANDIDA INFECTION: ICD-10-CM

## 2019-06-07 DIAGNOSIS — F41.9 ANXIETY: ICD-10-CM

## 2019-06-07 RX ORDER — ALPRAZOLAM 0.25 MG/1
0.25 TABLET ORAL
Qty: 10 TABLET | Refills: 0 | Status: SHIPPED | OUTPATIENT
Start: 2019-06-07 | End: 2019-06-07 | Stop reason: SDUPTHER

## 2019-06-07 RX ORDER — NYSTATIN 100000 U/G
CREAM TOPICAL DAILY PRN
Qty: 10 G | Refills: 0 | Status: SHIPPED | OUTPATIENT
Start: 2019-06-07 | End: 2019-06-07 | Stop reason: SDUPTHER

## 2019-06-07 RX ORDER — NYSTATIN 100000 U/G
CREAM TOPICAL 2 TIMES DAILY
Qty: 30 G | Refills: 0 | Status: SHIPPED | OUTPATIENT
Start: 2019-06-07 | End: 2019-07-15 | Stop reason: SDUPTHER

## 2019-06-07 RX ORDER — ALPRAZOLAM 0.25 MG/1
0.25 TABLET ORAL
Qty: 10 TABLET | Refills: 0 | Status: SHIPPED | OUTPATIENT
Start: 2019-06-07 | End: 2019-06-17 | Stop reason: SDUPTHER

## 2019-06-17 ENCOUNTER — TELEPHONE (OUTPATIENT)
Dept: FAMILY MEDICINE CLINIC | Facility: CLINIC | Age: 49
End: 2019-06-17

## 2019-06-17 DIAGNOSIS — F41.9 ANXIETY: ICD-10-CM

## 2019-06-17 RX ORDER — ALPRAZOLAM 0.25 MG/1
0.25 TABLET ORAL
Qty: 30 TABLET | Refills: 0 | Status: SHIPPED | OUTPATIENT
Start: 2019-06-17 | End: 2020-06-09 | Stop reason: ALTCHOICE

## 2019-07-10 DIAGNOSIS — B37.9 CANDIDA INFECTION: ICD-10-CM

## 2019-07-15 DIAGNOSIS — B37.9 CANDIDA INFECTION: ICD-10-CM

## 2019-07-15 RX ORDER — NYSTATIN 100000 U/G
CREAM TOPICAL 2 TIMES DAILY
Qty: 30 G | Refills: 0 | Status: SHIPPED | OUTPATIENT
Start: 2019-07-15 | End: 2019-08-12 | Stop reason: SDUPTHER

## 2019-07-15 RX ORDER — NYSTATIN 100000 U/G
CREAM TOPICAL 2 TIMES DAILY
Qty: 30 G | Refills: 0 | OUTPATIENT
Start: 2019-07-15

## 2019-08-12 DIAGNOSIS — B37.9 CANDIDA INFECTION: ICD-10-CM

## 2019-08-12 RX ORDER — NYSTATIN 100000 U/G
CREAM TOPICAL 2 TIMES DAILY
Qty: 30 G | Refills: 0 | Status: SHIPPED | OUTPATIENT
Start: 2019-08-12 | End: 2020-06-09 | Stop reason: ALTCHOICE

## 2019-10-29 DIAGNOSIS — M54.2 CERVICALGIA: ICD-10-CM

## 2019-10-29 RX ORDER — CYCLOBENZAPRINE HCL 10 MG
TABLET ORAL
Qty: 60 TABLET | Refills: 0 | Status: SHIPPED | OUTPATIENT
Start: 2019-10-29 | End: 2019-11-24 | Stop reason: SDUPTHER

## 2019-11-24 DIAGNOSIS — M54.2 CERVICALGIA: ICD-10-CM

## 2019-11-24 RX ORDER — CYCLOBENZAPRINE HCL 10 MG
TABLET ORAL
Qty: 60 TABLET | Refills: 0 | Status: SHIPPED | OUTPATIENT
Start: 2019-11-24 | End: 2019-12-28 | Stop reason: SDUPTHER

## 2019-12-28 DIAGNOSIS — M54.2 CERVICALGIA: ICD-10-CM

## 2019-12-30 RX ORDER — CYCLOBENZAPRINE HCL 10 MG
TABLET ORAL
Qty: 60 TABLET | Refills: 0 | Status: SHIPPED | OUTPATIENT
Start: 2019-12-30 | End: 2020-01-27 | Stop reason: SDUPTHER

## 2020-01-26 DIAGNOSIS — M54.2 CERVICALGIA: ICD-10-CM

## 2020-01-27 DIAGNOSIS — M54.2 CERVICALGIA: ICD-10-CM

## 2020-01-27 RX ORDER — CYCLOBENZAPRINE HCL 10 MG
TABLET ORAL
Qty: 60 TABLET | Refills: 0 | OUTPATIENT
Start: 2020-01-27

## 2020-01-27 RX ORDER — CYCLOBENZAPRINE HCL 10 MG
10 TABLET ORAL 2 TIMES DAILY PRN
Qty: 60 TABLET | Refills: 0 | Status: SHIPPED | OUTPATIENT
Start: 2020-01-27 | End: 2020-02-21

## 2020-02-21 DIAGNOSIS — M54.2 CERVICALGIA: ICD-10-CM

## 2020-02-21 RX ORDER — CYCLOBENZAPRINE HCL 10 MG
TABLET ORAL
Qty: 60 TABLET | Refills: 0 | Status: SHIPPED | OUTPATIENT
Start: 2020-02-21 | End: 2020-03-21

## 2020-03-20 DIAGNOSIS — M54.2 CERVICALGIA: ICD-10-CM

## 2020-03-21 RX ORDER — CYCLOBENZAPRINE HCL 10 MG
TABLET ORAL
Qty: 60 TABLET | Refills: 0 | Status: SHIPPED | OUTPATIENT
Start: 2020-03-21 | End: 2020-04-24

## 2020-04-24 DIAGNOSIS — M54.2 CERVICALGIA: ICD-10-CM

## 2020-04-24 RX ORDER — CYCLOBENZAPRINE HCL 10 MG
TABLET ORAL
Qty: 60 TABLET | Refills: 0 | Status: SHIPPED | OUTPATIENT
Start: 2020-04-24 | End: 2020-05-26

## 2020-05-07 ENCOUNTER — TELEPHONE (OUTPATIENT)
Dept: FAMILY MEDICINE CLINIC | Facility: CLINIC | Age: 50
End: 2020-05-07

## 2020-05-22 DIAGNOSIS — M54.2 CERVICALGIA: ICD-10-CM

## 2020-05-24 DIAGNOSIS — M54.2 CERVICALGIA: ICD-10-CM

## 2020-05-26 RX ORDER — CYCLOBENZAPRINE HCL 10 MG
TABLET ORAL
Qty: 60 TABLET | Refills: 0 | Status: SHIPPED | OUTPATIENT
Start: 2020-05-26 | End: 2020-06-09 | Stop reason: SDUPTHER

## 2020-05-27 RX ORDER — CYCLOBENZAPRINE HCL 10 MG
10 TABLET ORAL 2 TIMES DAILY PRN
Qty: 60 TABLET | Refills: 0 | OUTPATIENT
Start: 2020-05-27

## 2020-06-09 ENCOUNTER — TELEMEDICINE (OUTPATIENT)
Dept: FAMILY MEDICINE CLINIC | Facility: CLINIC | Age: 50
End: 2020-06-09
Payer: COMMERCIAL

## 2020-06-09 VITALS — SYSTOLIC BLOOD PRESSURE: 123 MMHG | WEIGHT: 181 LBS | DIASTOLIC BLOOD PRESSURE: 90 MMHG | BODY MASS INDEX: 31.07 KG/M2

## 2020-06-09 DIAGNOSIS — Z12.31 ENCOUNTER FOR SCREENING MAMMOGRAM FOR MALIGNANT NEOPLASM OF BREAST: Primary | ICD-10-CM

## 2020-06-09 DIAGNOSIS — M54.2 CERVICALGIA: ICD-10-CM

## 2020-06-09 DIAGNOSIS — N63.0 BREAST NODULE: ICD-10-CM

## 2020-06-09 PROCEDURE — 99214 OFFICE O/P EST MOD 30 MIN: CPT | Performed by: FAMILY MEDICINE

## 2020-06-09 RX ORDER — CYCLOBENZAPRINE HCL 10 MG
10 TABLET ORAL 2 TIMES DAILY PRN
Qty: 60 TABLET | Refills: 5 | Status: SHIPPED | OUTPATIENT
Start: 2020-06-09 | End: 2020-11-25 | Stop reason: SDUPTHER

## 2020-10-14 ENCOUNTER — IMMUNIZATIONS (OUTPATIENT)
Dept: FAMILY MEDICINE CLINIC | Facility: CLINIC | Age: 50
End: 2020-10-14
Payer: COMMERCIAL

## 2020-10-14 DIAGNOSIS — Z23 ENCOUNTER FOR IMMUNIZATION: Primary | ICD-10-CM

## 2020-10-14 PROCEDURE — 90715 TDAP VACCINE 7 YRS/> IM: CPT

## 2020-10-14 PROCEDURE — 90682 RIV4 VACC RECOMBINANT DNA IM: CPT

## 2020-10-14 PROCEDURE — 90471 IMMUNIZATION ADMIN: CPT

## 2020-10-14 PROCEDURE — 90472 IMMUNIZATION ADMIN EACH ADD: CPT

## 2020-11-03 DIAGNOSIS — Z12.31 ENCOUNTER FOR SCREENING MAMMOGRAM FOR MALIGNANT NEOPLASM OF BREAST: ICD-10-CM

## 2020-11-25 DIAGNOSIS — M54.2 CERVICALGIA: ICD-10-CM

## 2020-11-25 RX ORDER — CYCLOBENZAPRINE HCL 10 MG
10 TABLET ORAL 2 TIMES DAILY PRN
Qty: 60 TABLET | Refills: 5 | Status: SHIPPED | OUTPATIENT
Start: 2020-11-25 | End: 2021-01-14 | Stop reason: SDUPTHER

## 2021-01-14 DIAGNOSIS — M54.2 CERVICALGIA: ICD-10-CM

## 2021-01-14 RX ORDER — CYCLOBENZAPRINE HCL 10 MG
10 TABLET ORAL 3 TIMES DAILY PRN
Qty: 90 TABLET | Refills: 0 | Status: SHIPPED | OUTPATIENT
Start: 2021-01-14 | End: 2021-04-22 | Stop reason: SDUPTHER

## 2021-04-09 DIAGNOSIS — Z23 ENCOUNTER FOR IMMUNIZATION: ICD-10-CM

## 2021-04-22 ENCOUNTER — TELEMEDICINE (OUTPATIENT)
Dept: FAMILY MEDICINE CLINIC | Facility: CLINIC | Age: 51
End: 2021-04-22
Payer: COMMERCIAL

## 2021-04-22 VITALS — HEIGHT: 65 IN | BODY MASS INDEX: 30.99 KG/M2 | WEIGHT: 186 LBS

## 2021-04-22 DIAGNOSIS — M54.2 CERVICALGIA: ICD-10-CM

## 2021-04-22 PROCEDURE — 1036F TOBACCO NON-USER: CPT | Performed by: FAMILY MEDICINE

## 2021-04-22 PROCEDURE — 99213 OFFICE O/P EST LOW 20 MIN: CPT | Performed by: FAMILY MEDICINE

## 2021-04-22 PROCEDURE — 3725F SCREEN DEPRESSION PERFORMED: CPT | Performed by: FAMILY MEDICINE

## 2021-04-22 PROCEDURE — 3008F BODY MASS INDEX DOCD: CPT | Performed by: FAMILY MEDICINE

## 2021-04-22 RX ORDER — GABAPENTIN 300 MG/1
300 CAPSULE ORAL 2 TIMES DAILY
Qty: 30 CAPSULE | Refills: 1 | Status: SHIPPED | OUTPATIENT
Start: 2021-04-22 | End: 2021-05-20 | Stop reason: SDUPTHER

## 2021-04-22 RX ORDER — ESTRADIOL 0.75 MG/1.25G
GEL, METERED TOPICAL
COMMUNITY
Start: 2021-03-22

## 2021-04-22 RX ORDER — MEDROXYPROGESTERONE ACETATE 2.5 MG/1
2.5 TABLET ORAL DAILY
COMMUNITY
Start: 2021-03-19

## 2021-04-22 RX ORDER — CYCLOBENZAPRINE HCL 10 MG
10 TABLET ORAL 3 TIMES DAILY PRN
Qty: 90 TABLET | Refills: 5 | Status: SHIPPED | OUTPATIENT
Start: 2021-04-22 | End: 2021-06-15 | Stop reason: SDUPTHER

## 2021-04-22 NOTE — PROGRESS NOTES
Virtual Regular Visit      Assessment/Plan:    Problem List Items Addressed This Visit        Other    Cervicalgia     We are going to have her try some gabapentin 300 at bedtime  If she suffers no side effects from the gabapentin she can increase to a total of 900 at bedtime if tolerated and she feels that is affective  She is going to avoid taking the cyclobenzaprine  If the gabapentin is not effective she will resume using cyclobenzaprine but again she is warned not to use them concomitantly  She is also asked to try to get regular aerobic exercise, range of motion exercises with her neck and additionally she may be returning to her acupuncturist   She will call with report over the next couple weeks as to the effectiveness of these interventions  She agrees with this plan  Relevant Medications    gabapentin (NEURONTIN) 300 mg capsule    cyclobenzaprine (FLEXERIL) 10 mg tablet               Reason for visit is   Chief Complaint   Patient presents with    Neck Pain     ongoing and would like to discuss  bmi f/u plan needed    Virtual Regular Visit        Encounter provider Flora Whitten MD    Provider located at [address] [address]      Recent Visits  No visits were found meeting these conditions  Showing recent visits within past 7 days and meeting all other requirements     Today's Visits  Date Type Provider Dept   04/22/21 Telemedicine Flora Whitten MD [facility]   Showing today's visits and meeting all other requirements     Future Appointments  No visits were found meeting these conditions  Showing future appointments within next 150 days and meeting all other requirements        The patient was identified by name and date of birth   Melissa Xie was informed that this is a telemedicine visit and that the visit is being conducted through [facility] and patient was informed that this is a secure, HIPAA-compliant platform  She agrees to proceed     My office door was closed  No one else was in the room  She acknowledged consent and understanding of privacy and security of the video platform  The patient has agreed to participate and understands they can discontinue the visit at any time  Patient is aware this is a billable service  Subjective  Tea Scott is a 44 y o  female who presents today virtually with complaint of neck pain  Everything is good, the flexeril really helps  No radicular sx  Awakening with pain in occiput ans a migraine feeling  HPI   The patient is a 47-year-old female who presents today for follow-up chronic cervicalgia  She states that if she takes 3 cyclobenzaprine at bedtime she gets a good night's rest and wakes up feeling well  If she takes less than that she wakes up with neck pain which radiates to her occiput  She has no radicular symptoms  She has no incontinence of bowel or bladder weakness of her lower extremities  She did get good news over the winter as her 's renal cell carcinoma is responding to immunotherapy trial and that is been contributing to her sense of well-being as well    Past Medical History:   Diagnosis Date    Muscle spasms of neck     Muscular abdominal pain in periumbilical region 0/07/3803    Right calf pain 3/30/2018    Swimmer's ear of left side 8/10/2018    Tick bite     last assessed 5/5/15  documented resolved 5/5/16       Past Surgical History:   Procedure Laterality Date    WISDOM TOOTH EXTRACTION         Current Outpatient Medications   Medication Sig Dispense Refill    cyclobenzaprine (FLEXERIL) 10 mg tablet Take 1 tablet (10 mg total) by mouth 3 (three) times a day as needed for muscle spasms 90 tablet 5    Estrogel 0 75 MG/1 25 GM (0 06%) topical gel APPLY ON THE SKIN DAILY      medroxyPROGESTERone (PROVERA) 2 5 mg tablet Take 2 5 mg by mouth daily      mometasone (ELOCON) 0 1 % cream as needed       nabumetone (RELAFEN) 750 mg tablet Take 1 tablet (750 mg total) by mouth 2 (two) times a day as needed for mild pain 60 tablet 0    gabapentin (NEURONTIN) 300 mg capsule Take 1 capsule (300 mg total) by mouth 2 (two) times a day 30 capsule 1     No current facility-administered medications for this visit  No Known Allergies    Review of Systems   Gastrointestinal:        No incontinence of bowel   Genitourinary:        No incontinence of bladder   Musculoskeletal: Positive for neck pain and neck stiffness  Video Exam    Vitals:    04/22/21 1456   Weight: 84 4 kg (186 lb)   Height: 5' 5" (1 651 m)       Physical Exam  Constitutional:       General: She is not in acute distress  Appearance: She is not ill-appearing  Neurological:      Mental Status: She is alert and oriented to person, place, and time  Psychiatric:         Mood and Affect: Mood normal          Thought Content: Thought content normal          Judgment: Judgment normal           I spent 15 minutes directly with the patient during this visit      VIRTUAL VISIT [address] acknowledges that she has consented to an online visit or consultation  She understands that the online visit is based solely on information provided by her, and that, in the absence of a face-to-face physical evaluation by the physician, the diagnosis she receives is both limited and provisional in terms of accuracy and completeness  This is not intended to replace a full medical face-to-face evaluation by the physician  Tea Scott understands and accepts these terms

## 2021-04-22 NOTE — ASSESSMENT & PLAN NOTE
We are going to have her try some gabapentin 300 at bedtime  If she suffers no side effects from the gabapentin she can increase to a total of 900 at bedtime if tolerated and she feels that is affective  She is going to avoid taking the cyclobenzaprine  If the gabapentin is not effective she will resume using cyclobenzaprine but again she is warned not to use them concomitantly  She is also asked to try to get regular aerobic exercise, range of motion exercises with her neck and additionally she may be returning to her acupuncturist   She will call with report over the next couple weeks as to the effectiveness of these interventions  She agrees with this plan

## 2021-05-20 DIAGNOSIS — M54.2 CERVICALGIA: ICD-10-CM

## 2021-05-20 RX ORDER — GABAPENTIN 300 MG/1
300 CAPSULE ORAL 2 TIMES DAILY
Qty: 30 CAPSULE | Refills: 0 | Status: SHIPPED | OUTPATIENT
Start: 2021-05-20 | End: 2021-06-08 | Stop reason: SDUPTHER

## 2021-06-08 DIAGNOSIS — M54.2 CERVICALGIA: ICD-10-CM

## 2021-06-08 RX ORDER — GABAPENTIN 300 MG/1
300 CAPSULE ORAL 2 TIMES DAILY
Qty: 30 CAPSULE | Refills: 0 | Status: SHIPPED | OUTPATIENT
Start: 2021-06-08 | End: 2021-06-28 | Stop reason: SDUPTHER

## 2021-06-15 DIAGNOSIS — M54.2 CERVICALGIA: ICD-10-CM

## 2021-06-15 RX ORDER — CYCLOBENZAPRINE HCL 10 MG
10 TABLET ORAL 3 TIMES DAILY PRN
Qty: 90 TABLET | Refills: 0 | Status: SHIPPED | OUTPATIENT
Start: 2021-06-15 | End: 2021-12-13

## 2021-06-28 DIAGNOSIS — M54.2 CERVICALGIA: ICD-10-CM

## 2021-06-29 RX ORDER — GABAPENTIN 300 MG/1
300 CAPSULE ORAL 2 TIMES DAILY
Qty: 30 CAPSULE | Refills: 0 | Status: SHIPPED | OUTPATIENT
Start: 2021-06-29 | End: 2021-07-14 | Stop reason: SDUPTHER

## 2021-07-14 DIAGNOSIS — M54.2 CERVICALGIA: ICD-10-CM

## 2021-07-15 RX ORDER — GABAPENTIN 300 MG/1
300 CAPSULE ORAL 2 TIMES DAILY
Qty: 30 CAPSULE | Refills: 0 | Status: SHIPPED | OUTPATIENT
Start: 2021-07-15 | End: 2021-07-27 | Stop reason: SDUPTHER

## 2021-07-27 ENCOUNTER — OFFICE VISIT (OUTPATIENT)
Dept: FAMILY MEDICINE CLINIC | Facility: CLINIC | Age: 51
End: 2021-07-27
Payer: COMMERCIAL

## 2021-07-27 VITALS
TEMPERATURE: 98.5 F | HEART RATE: 76 BPM | DIASTOLIC BLOOD PRESSURE: 80 MMHG | WEIGHT: 191 LBS | SYSTOLIC BLOOD PRESSURE: 115 MMHG | HEIGHT: 65 IN | OXYGEN SATURATION: 99 % | BODY MASS INDEX: 31.82 KG/M2

## 2021-07-27 DIAGNOSIS — Z13.220 SCREENING FOR LIPID DISORDERS: ICD-10-CM

## 2021-07-27 DIAGNOSIS — R74.8 ABNORMAL GGT TEST: ICD-10-CM

## 2021-07-27 DIAGNOSIS — Z13.21 SCREENING FOR ENDOCRINE, NUTRITIONAL, METABOLIC AND IMMUNITY DISORDER: ICD-10-CM

## 2021-07-27 DIAGNOSIS — Z13.29 SCREENING FOR ENDOCRINE, NUTRITIONAL, METABOLIC AND IMMUNITY DISORDER: ICD-10-CM

## 2021-07-27 DIAGNOSIS — Z13.0 SCREENING FOR IRON DEFICIENCY ANEMIA: ICD-10-CM

## 2021-07-27 DIAGNOSIS — M54.2 CERVICALGIA: Primary | ICD-10-CM

## 2021-07-27 DIAGNOSIS — Z13.228 SCREENING FOR ENDOCRINE, NUTRITIONAL, METABOLIC AND IMMUNITY DISORDER: ICD-10-CM

## 2021-07-27 DIAGNOSIS — Z11.4 SCREENING FOR HIV (HUMAN IMMUNODEFICIENCY VIRUS): ICD-10-CM

## 2021-07-27 DIAGNOSIS — Z13.0 SCREENING FOR ENDOCRINE, NUTRITIONAL, METABOLIC AND IMMUNITY DISORDER: ICD-10-CM

## 2021-07-27 DIAGNOSIS — Z11.59 NEED FOR HEPATITIS C SCREENING TEST: ICD-10-CM

## 2021-07-27 DIAGNOSIS — Z12.11 SCREENING FOR MALIGNANT NEOPLASM OF COLON: ICD-10-CM

## 2021-07-27 PROCEDURE — 99213 OFFICE O/P EST LOW 20 MIN: CPT | Performed by: FAMILY MEDICINE

## 2021-07-27 PROCEDURE — 3008F BODY MASS INDEX DOCD: CPT | Performed by: FAMILY MEDICINE

## 2021-07-27 PROCEDURE — 1036F TOBACCO NON-USER: CPT | Performed by: FAMILY MEDICINE

## 2021-07-27 PROCEDURE — 36415 COLL VENOUS BLD VENIPUNCTURE: CPT | Performed by: FAMILY MEDICINE

## 2021-07-27 RX ORDER — GABAPENTIN 300 MG/1
300 CAPSULE ORAL 2 TIMES DAILY
Qty: 60 CAPSULE | Refills: 5 | Status: SHIPPED | OUTPATIENT
Start: 2021-07-27 | End: 2021-08-13 | Stop reason: SDUPTHER

## 2021-07-27 NOTE — ASSESSMENT & PLAN NOTE
She has gained some weight the pandemic but recently lost 5 lb  We are going to get CBC CMP lipids as well as a GGT today

## 2021-07-27 NOTE — ASSESSMENT & PLAN NOTE
The patient has some chronic cervicalgia  She will continue to use p r n  gabapentin through the day and cyclobenzaprine at night  She is again advised to not use the to can commonly due to increased risk of sedation fall X cetera  Additionally she will continue to use Relafen as needed  She has no worrisome symptoms such as incontinence of bowel or bladder weakness of her lower extremities  Additionally she has no cervical radicular symptoms  See her back routinely

## 2021-07-27 NOTE — PROGRESS NOTES
BMI Counseling: Body mass index is 31 78 kg/m²  The BMI is above normal  Nutrition recommendations include decreasing portion sizes, encouraging healthy choices of fruits and vegetables, consuming healthier snacks, limiting drinks that contain sugar and moderation in carbohydrate intake  Exercise recommendations include moderate physical activity 150 minutes/week and exercising 3-5 times per week  No pharmacotherapy was ordered  Assessment/Plan:  Cervicalgia  The patient has some chronic cervicalgia  She will continue to use p r n  gabapentin through the day and cyclobenzaprine at night  She is again advised to not use the to can commonly due to increased risk of sedation fall X cetera  Additionally she will continue to use Relafen as needed  She has no worrisome symptoms such as incontinence of bowel or bladder weakness of her lower extremities  Additionally she has no cervical radicular symptoms  See her back routinely  Abnormal GGT test  She has gained some weight the pandemic but recently lost 5 lb  We are going to get CBC CMP lipids as well as a GGT today  Diagnoses and all orders for this visit:    Cervicalgia  -     gabapentin (NEURONTIN) 300 mg capsule; Take 1 capsule (300 mg total) by mouth 2 (two) times a day    Screening for lipid disorders  -     Lipid panel    Screening for HIV (human immunodeficiency virus)  -     Human Immunodeficiency Virus 1/2 Antigen / Antibody ( Fourth Generation) with Reflex Testing    Need for hepatitis C screening test  -     Hepatitis C Ab W/Refl To HCV RNA, Qn, PCR    Screening for endocrine, nutritional, metabolic and immunity disorder  -     Comprehensive metabolic panel    Screening for iron deficiency anemia  -     CBC    Screening for malignant neoplasm of colon  -     Cologuard;  Future    Abnormal GGT test          Subjective:   Chief Complaint   Patient presents with    Follow-up     here for med check and fbw  bmi f/u plan needed Patient ID: Alexandra Weaver is a 44 y o  female  Using rowing machine for exercise now  Gabapentin helpful HEIN  Takes at bedtime but with neck spasms uses cyclobenzaprine  Does not mix the 2  Going for acupuncture therapy  Rare Relafen use  No radicular sx  No LE weakness  No incontinence B/B      HPI  Patient is a 47-year-old female who presents today for routine follow-up  She has chronic cervicalgia  She feels that it is mainly stress related  Her 's undergoing trial immunotherapy for metastatic renal cell CA and this has been very stressful  She has been trying to use a rowing machine for exercise which has been helpful  She has been using gabapentin once or twice a day for headaches  She sometimes takes at bedtime  If she feels like she has spasm she uses cyclobenzaprine but never mix the 2  She rarely takes Relafen as well  She has been going for acupuncture as well  This seems to be helpful  Radicular symptoms to her upper extremities  She has no weakness of her lower extremities and no incontinence of bowel or bladder  She has had some weight gain during the pandemic  She has recently been working on trying to improve this and has lost 5 lb  She has no cardiovascular pulmonary complaint  The following portions of the patient's history were reviewed and updated as appropriate: allergies, current medications, past family history, past medical history, past social history, past surgical history and problem list     ROS    Limited and pertinent review of systems as per the HPI  Objective:    Physical Exam  Vitals and nursing note reviewed  Constitutional:       Appearance: Normal appearance  Neck:      Vascular: No carotid bruit  Comments: She does have some mildly limited lateral flexion left greater than right  Flexion and extension are fairly normal   Lateral rotation is fairly normal as well  Again she does have some mild discomfort    Cardiovascular:      Rate and Rhythm: Normal rate and regular rhythm  Pulmonary:      Effort: Pulmonary effort is normal       Breath sounds: Normal breath sounds  No wheezing, rhonchi or rales  Musculoskeletal:      Cervical back: No rigidity or tenderness  Right lower leg: No edema  Left lower leg: No edema  Comments: Normal bulk strength and tone of her upper extremities  Lymphadenopathy:      Cervical: No cervical adenopathy  Neurological:      Mental Status: She is alert and oriented to person, place, and time  Psychiatric:         Mood and Affect: Mood normal          Thought Content:  Thought content normal          Judgment: Judgment normal

## 2021-07-28 LAB
ALBUMIN SERPL-MCNC: 4.5 G/DL (ref 3.6–5.1)
ALBUMIN/GLOB SERPL: 2 (CALC) (ref 1–2.5)
ALP SERPL-CCNC: 55 U/L (ref 37–153)
ALT SERPL-CCNC: 15 U/L (ref 6–29)
AST SERPL-CCNC: 13 U/L (ref 10–35)
BILIRUB SERPL-MCNC: 0.6 MG/DL (ref 0.2–1.2)
BUN SERPL-MCNC: 10 MG/DL (ref 7–25)
BUN/CREAT SERPL: NORMAL (CALC) (ref 6–22)
CALCIUM SERPL-MCNC: 9.3 MG/DL (ref 8.6–10.4)
CHLORIDE SERPL-SCNC: 103 MMOL/L (ref 98–110)
CHOLEST SERPL-MCNC: 179 MG/DL
CHOLEST/HDLC SERPL: 2.5 (CALC)
CO2 SERPL-SCNC: 26 MMOL/L (ref 20–32)
CREAT SERPL-MCNC: 0.7 MG/DL (ref 0.5–1.05)
ERYTHROCYTE [DISTWIDTH] IN BLOOD BY AUTOMATED COUNT: 12.6 % (ref 11–15)
GGT SERPL-CCNC: 109 U/L (ref 3–70)
GLOBULIN SER CALC-MCNC: 2.2 G/DL (CALC) (ref 1.9–3.7)
GLUCOSE SERPL-MCNC: 88 MG/DL (ref 65–99)
HCT VFR BLD AUTO: 43.1 % (ref 35–45)
HCV AB S/CO SERPL IA: 0.01
HCV AB SERPL QL IA: NORMAL
HDLC SERPL-MCNC: 73 MG/DL
HGB BLD-MCNC: 14.2 G/DL (ref 11.7–15.5)
HIV 1+2 AB+HIV1 P24 AG SERPL QL IA: NORMAL
LDLC SERPL CALC-MCNC: 83 MG/DL (CALC)
MCH RBC QN AUTO: 31.3 PG (ref 27–33)
MCHC RBC AUTO-ENTMCNC: 32.9 G/DL (ref 32–36)
MCV RBC AUTO: 95.1 FL (ref 80–100)
NONHDLC SERPL-MCNC: 106 MG/DL (CALC)
PLATELET # BLD AUTO: 260 THOUSAND/UL (ref 140–400)
PMV BLD REES-ECKER: 9.7 FL (ref 7.5–12.5)
POTASSIUM SERPL-SCNC: 4.5 MMOL/L (ref 3.5–5.3)
PROT SERPL-MCNC: 6.7 G/DL (ref 6.1–8.1)
RBC # BLD AUTO: 4.53 MILLION/UL (ref 3.8–5.1)
SL AMB EGFR AFRICAN AMERICAN: 115 ML/MIN/1.73M2
SL AMB EGFR NON AFRICAN AMERICAN: 100 ML/MIN/1.73M2
SODIUM SERPL-SCNC: 138 MMOL/L (ref 135–146)
TRIGL SERPL-MCNC: 130 MG/DL
WBC # BLD AUTO: 5.7 THOUSAND/UL (ref 3.8–10.8)

## 2021-08-13 DIAGNOSIS — M54.2 CERVICALGIA: ICD-10-CM

## 2021-08-16 RX ORDER — GABAPENTIN 300 MG/1
300 CAPSULE ORAL 2 TIMES DAILY
Qty: 60 CAPSULE | Refills: 0 | Status: SHIPPED | OUTPATIENT
Start: 2021-08-16 | End: 2021-09-10 | Stop reason: SDUPTHER

## 2021-09-10 DIAGNOSIS — M54.2 CERVICALGIA: ICD-10-CM

## 2021-09-10 RX ORDER — GABAPENTIN 300 MG/1
300 CAPSULE ORAL 2 TIMES DAILY
Qty: 60 CAPSULE | Refills: 0 | Status: SHIPPED | OUTPATIENT
Start: 2021-09-10 | End: 2021-10-08 | Stop reason: SDUPTHER

## 2021-10-08 DIAGNOSIS — M54.2 CERVICALGIA: ICD-10-CM

## 2021-10-08 RX ORDER — GABAPENTIN 300 MG/1
300 CAPSULE ORAL 2 TIMES DAILY
Qty: 60 CAPSULE | Refills: 0 | Status: SHIPPED | OUTPATIENT
Start: 2021-10-08 | End: 2021-11-02 | Stop reason: SDUPTHER

## 2021-11-02 DIAGNOSIS — M54.2 CERVICALGIA: ICD-10-CM

## 2021-11-02 RX ORDER — GABAPENTIN 300 MG/1
300 CAPSULE ORAL 2 TIMES DAILY
Qty: 60 CAPSULE | Refills: 0 | Status: SHIPPED | OUTPATIENT
Start: 2021-11-02 | End: 2021-11-23 | Stop reason: SDUPTHER

## 2021-11-23 DIAGNOSIS — M54.2 CERVICALGIA: ICD-10-CM

## 2021-11-23 RX ORDER — GABAPENTIN 300 MG/1
300 CAPSULE ORAL 3 TIMES DAILY
Qty: 90 CAPSULE | Refills: 1 | Status: SHIPPED | OUTPATIENT
Start: 2021-11-23 | End: 2022-01-18 | Stop reason: SDUPTHER

## 2021-12-13 DIAGNOSIS — M54.2 CERVICALGIA: ICD-10-CM

## 2021-12-13 RX ORDER — CYCLOBENZAPRINE HCL 10 MG
TABLET ORAL
Qty: 90 TABLET | Refills: 0 | Status: SHIPPED | OUTPATIENT
Start: 2021-12-13 | End: 2022-01-05

## 2022-01-05 DIAGNOSIS — M54.2 CERVICALGIA: ICD-10-CM

## 2022-01-05 RX ORDER — CYCLOBENZAPRINE HCL 10 MG
TABLET ORAL
Qty: 90 TABLET | Refills: 0 | Status: SHIPPED | OUTPATIENT
Start: 2022-01-05 | End: 2022-02-07

## 2022-01-18 DIAGNOSIS — M54.2 CERVICALGIA: ICD-10-CM

## 2022-01-18 RX ORDER — GABAPENTIN 300 MG/1
300 CAPSULE ORAL 3 TIMES DAILY
Qty: 90 CAPSULE | Refills: 0 | Status: SHIPPED | OUTPATIENT
Start: 2022-01-18 | End: 2022-02-10 | Stop reason: SDUPTHER

## 2022-02-10 DIAGNOSIS — M54.2 CERVICALGIA: ICD-10-CM

## 2022-02-10 RX ORDER — GABAPENTIN 300 MG/1
300 CAPSULE ORAL 3 TIMES DAILY
Qty: 90 CAPSULE | Refills: 0 | Status: SHIPPED | OUTPATIENT
Start: 2022-02-10 | End: 2022-03-11 | Stop reason: SDUPTHER

## 2022-03-11 ENCOUNTER — TELEMEDICINE (OUTPATIENT)
Dept: FAMILY MEDICINE CLINIC | Facility: CLINIC | Age: 52
End: 2022-03-11

## 2022-03-11 VITALS — BODY MASS INDEX: 30.82 KG/M2 | HEIGHT: 65 IN | WEIGHT: 185 LBS

## 2022-03-11 DIAGNOSIS — M54.2 CERVICALGIA: ICD-10-CM

## 2022-03-11 DIAGNOSIS — R74.8 ABNORMAL GGT TEST: Primary | ICD-10-CM

## 2022-03-11 DIAGNOSIS — Z13.220 SCREENING FOR LIPID DISORDERS: ICD-10-CM

## 2022-03-11 DIAGNOSIS — F41.9 ANXIETY: ICD-10-CM

## 2022-03-11 PROCEDURE — 3008F BODY MASS INDEX DOCD: CPT | Performed by: FAMILY MEDICINE

## 2022-03-11 PROCEDURE — 99213 OFFICE O/P EST LOW 20 MIN: CPT | Performed by: FAMILY MEDICINE

## 2022-03-11 PROCEDURE — 3725F SCREEN DEPRESSION PERFORMED: CPT | Performed by: FAMILY MEDICINE

## 2022-03-11 PROCEDURE — 1036F TOBACCO NON-USER: CPT | Performed by: FAMILY MEDICINE

## 2022-03-11 RX ORDER — CYCLOBENZAPRINE HCL 10 MG
10 TABLET ORAL 3 TIMES DAILY PRN
Qty: 90 TABLET | Refills: 1 | Status: SHIPPED | OUTPATIENT
Start: 2022-03-11 | End: 2022-05-03 | Stop reason: SDUPTHER

## 2022-03-11 RX ORDER — GABAPENTIN 300 MG/1
300 CAPSULE ORAL 4 TIMES DAILY
Qty: 120 CAPSULE | Refills: 5 | Status: SHIPPED | OUTPATIENT
Start: 2022-03-11 | End: 2022-08-10 | Stop reason: SDUPTHER

## 2022-03-11 NOTE — ASSESSMENT & PLAN NOTE
Cyclobenzaprine HS as well as gabapentin in the daytime  She has no worrisome symptoms such as incontinence of bowel or bladder weakness of her lower extremities

## 2022-03-11 NOTE — ASSESSMENT & PLAN NOTE
Her GGT was trending down  She continues to significantly limit alcohol consumption  We did recommend that she get follow-up blood work this summer to include CBC, CMP, lipids as well as GGT  This will be done through [facility] and will follow-up virtually when results are available

## 2022-03-11 NOTE — ASSESSMENT & PLAN NOTE
She appears to be improved  Her 's medical condition has improved  She continues with gabapentin in the daytime as well as cyclobenzaprine at bedtime

## 2022-03-11 NOTE — PROGRESS NOTES
Virtual Regular Visit    Verification of patient location:    Patient is located in the following state in which I hold an active license [place]      Assessment/Plan:    Problem List Items Addressed This Visit        Other    Cervicalgia     Cyclobenzaprine HS as well as gabapentin in the daytime  She has no worrisome symptoms such as incontinence of bowel or bladder weakness of her lower extremities  Relevant Medications    gabapentin (NEURONTIN) 300 mg capsule    cyclobenzaprine (FLEXERIL) 10 mg tablet    Anxiety     She appears to be improved  Her 's medical condition has improved  She continues with gabapentin in the daytime as well as cyclobenzaprine at bedtime  Abnormal GGT test - Primary     Her GGT was trending down  She continues to significantly limit alcohol consumption  We did recommend that she get follow-up blood work this summer to include CBC, CMP, lipids as well as GGT  This will be done through [facility] and will follow-up virtually when results are available  Reason for visit is   Chief Complaint   Patient presents with    Follow-up     Med check    Virtual Regular Visit        Encounter provider Pooja Cross MD    Provider located at [address] [address]      Recent Visits  No visits were found meeting these conditions  Showing recent visits within past 7 days and meeting all other requirements  Today's Visits  Date Type Provider Dept   03/11/22 Telemedicine Pooja Cross MD [facility]   Showing today's visits and meeting all other requirements  Future Appointments  No visits were found meeting these conditions  Showing future appointments within next 150 days and meeting all other requirements       The patient was identified by name and date of birth   Narciso Anaya was informed that this is a telemedicine visit and that the visit is being conducted through [facility] and patient was informed that this is not a secure, HIPAA-compliant platform  She agrees to proceed     My office door was closed  No one else was in the room  She acknowledged consent and understanding of privacy and security of the video platform  The patient has agreed to participate and understands they can discontinue the visit at any time  Patient is aware this is a billable service  Subjective  Panchito Reddy is a 46 y o  female  We are doing good  Boby doing well, I dont have any changes, walking and rowing  Gabapentin  2 in am 1 later and 4th later  NO SE from gabapentin  Cyclobenzaprine  No SE  No SE from meds other than drowsiness  No CVP c/o  I feel good  GYN April and scheduling mammo  Limited ETOH  A history of chronic anxiety, cervicalgia as well as abnormal liver function enzyme testing  She states that overall she feels that she has been doing well  She has a hearing  [facility]    HPI   The patient is a 68-year-old female who presents today for routine follow-up of multiple medical problems including a history of chronic anxiety, cervicalgia as well as abnormal oval function testing  She states that she feels like she has been doing very well  She walks or roaslie on her rowing machine every day  She has been watching her diet  She has been drinking much less alcohol and feels that overall she is doing well  She uses her gabapentin typically for day which is helpful for her neck pain and headaches  She continues to take cyclobenzaprine at bedtime  She denies any cardiovascular pulmonary complaint  No GI or  complaint  No muscular weakness  She has a follow-up visit with her gynecologist in April and she will be scheduling mammography    Past Medical History:   Diagnosis Date    Muscle spasms of neck     Muscular abdominal pain in periumbilical region 2/74/6220    Right calf pain 3/30/2018    Swimmer's ear of left side 8/10/2018    Tick bite     last assessed 5/5/15  documented resolved 5/5/16       Past Surgical History:   Procedure Laterality Date    WISDOM TOOTH EXTRACTION         Current Outpatient Medications   Medication Sig Dispense Refill    cyclobenzaprine (FLEXERIL) 10 mg tablet Take 1 tablet (10 mg total) by mouth 3 (three) times a day as needed for muscle spasms 90 tablet 1    Estrogel 0 75 MG/1 25 GM (0 06%) topical gel APPLY ON THE SKIN DAILY      gabapentin (NEURONTIN) 300 mg capsule Take 1 capsule (300 mg total) by mouth 4 (four) times a day 120 capsule 5    medroxyPROGESTERone (PROVERA) 2 5 mg tablet Take 2 5 mg by mouth daily      mometasone (ELOCON) 0 1 % cream as needed       nabumetone (RELAFEN) 750 mg tablet Take 1 tablet (750 mg total) by mouth 2 (two) times a day as needed for mild pain 60 tablet 0     No current facility-administered medications for this visit  No Known Allergies    Review of Systems  Per the [facility]  Video Exam    Vitals:    03/11/22 1122   Weight: 83 9 kg (185 lb)   Height: 5' 5" (1 651 m)       Physical Exam  Vitals and nursing note reviewed  Constitutional:       Appearance: Normal appearance  Pulmonary:      Effort: Pulmonary effort is normal  No respiratory distress  Neurological:      Mental Status: She is alert and oriented to person, place, and time  Psychiatric:         Mood and Affect: Mood normal          Thought Content: Thought content normal          Judgment: Judgment normal         Wt Readings from Last 12 Encounters:   03/11/22 83 9 kg (185 lb)   07/27/21 86 6 kg (191 lb)   04/22/21 84 4 kg (186 lb)   06/09/20 82 1 kg (181 lb)   05/07/19 83 kg (183 lb)   04/05/19 83 7 kg (184 lb 8 oz)   09/18/18 82 6 kg (182 lb)   08/10/18 83 9 kg (185 lb)   03/30/18 82 6 kg (182 lb)   10/11/17 82 6 kg (182 lb)   02/03/17 82 1 kg (181 lb)   05/05/16 83 9 kg (185 lb)   ]  I spent 15 minutes directly with the patient during this visit    VIRTUAL VISIT [address] Magallanes verbally [facility]   Pt is aware that [employer] could be limited without vital signs or the ability to perform a full hands-on physical Gloria Ravi understands she or the provider may request at any time to terminate the video visit and request the patient to seek care or treatment in person

## 2022-08-10 DIAGNOSIS — M54.2 CERVICALGIA: ICD-10-CM

## 2022-08-10 RX ORDER — CYCLOBENZAPRINE HCL 10 MG
10 TABLET ORAL 3 TIMES DAILY PRN
Qty: 90 TABLET | Refills: 0 | Status: SHIPPED | OUTPATIENT
Start: 2022-08-10 | End: 2022-08-25 | Stop reason: SDUPTHER

## 2022-08-10 RX ORDER — GABAPENTIN 300 MG/1
300 CAPSULE ORAL 4 TIMES DAILY
Qty: 120 CAPSULE | Refills: 0 | Status: SHIPPED | OUTPATIENT
Start: 2022-08-10 | End: 2022-08-25 | Stop reason: SDUPTHER

## 2022-08-25 DIAGNOSIS — M54.2 CERVICALGIA: ICD-10-CM

## 2022-08-25 RX ORDER — GABAPENTIN 300 MG/1
300 CAPSULE ORAL 4 TIMES DAILY
Qty: 120 CAPSULE | Refills: 0 | Status: SHIPPED | OUTPATIENT
Start: 2022-08-25 | End: 2022-09-26 | Stop reason: SDUPTHER

## 2022-08-25 RX ORDER — CYCLOBENZAPRINE HCL 10 MG
10 TABLET ORAL 3 TIMES DAILY PRN
Qty: 90 TABLET | Refills: 0 | Status: SHIPPED | OUTPATIENT
Start: 2022-08-25 | End: 2022-09-26 | Stop reason: SDUPTHER

## 2022-09-26 DIAGNOSIS — M54.2 CERVICALGIA: ICD-10-CM

## 2022-09-26 RX ORDER — GABAPENTIN 300 MG/1
300 CAPSULE ORAL 4 TIMES DAILY
Qty: 120 CAPSULE | Refills: 0 | Status: SHIPPED | OUTPATIENT
Start: 2022-09-26 | End: 2022-10-17 | Stop reason: SDUPTHER

## 2022-09-26 RX ORDER — CYCLOBENZAPRINE HCL 10 MG
10 TABLET ORAL 3 TIMES DAILY PRN
Qty: 90 TABLET | Refills: 0 | Status: SHIPPED | OUTPATIENT
Start: 2022-09-26 | End: 2022-10-17 | Stop reason: SDUPTHER

## 2022-10-17 ENCOUNTER — OFFICE VISIT (OUTPATIENT)
Dept: INTERNAL MEDICINE CLINIC | Facility: CLINIC | Age: 52
End: 2022-10-17
Payer: COMMERCIAL

## 2022-10-17 VITALS
WEIGHT: 193 LBS | SYSTOLIC BLOOD PRESSURE: 130 MMHG | HEIGHT: 65 IN | HEART RATE: 78 BPM | TEMPERATURE: 97.8 F | DIASTOLIC BLOOD PRESSURE: 72 MMHG | OXYGEN SATURATION: 99 % | BODY MASS INDEX: 32.15 KG/M2 | RESPIRATION RATE: 12 BRPM

## 2022-10-17 DIAGNOSIS — E66.3 OVERWEIGHT: ICD-10-CM

## 2022-10-17 DIAGNOSIS — Z13.1 SCREENING FOR DIABETES MELLITUS: ICD-10-CM

## 2022-10-17 DIAGNOSIS — C43.62 MALIGNANT MELANOMA OF LEFT UPPER EXTREMITY INCLUDING SHOULDER (HCC): ICD-10-CM

## 2022-10-17 DIAGNOSIS — Z13.6 SCREENING FOR CARDIOVASCULAR CONDITION: Primary | ICD-10-CM

## 2022-10-17 DIAGNOSIS — M54.2 CERVICALGIA: ICD-10-CM

## 2022-10-17 DIAGNOSIS — Z12.11 SCREENING FOR COLON CANCER: ICD-10-CM

## 2022-10-17 DIAGNOSIS — Z23 ENCOUNTER FOR IMMUNIZATION: ICD-10-CM

## 2022-10-17 DIAGNOSIS — Z12.31 ENCOUNTER FOR SCREENING MAMMOGRAM FOR MALIGNANT NEOPLASM OF BREAST: ICD-10-CM

## 2022-10-17 DIAGNOSIS — F41.9 ANXIETY: ICD-10-CM

## 2022-10-17 DIAGNOSIS — R74.8 ABNORMAL GGT TEST: ICD-10-CM

## 2022-10-17 DIAGNOSIS — B97.7 HPV IN FEMALE: ICD-10-CM

## 2022-10-17 PROCEDURE — 90682 RIV4 VACC RECOMBINANT DNA IM: CPT

## 2022-10-17 PROCEDURE — 90471 IMMUNIZATION ADMIN: CPT

## 2022-10-17 PROCEDURE — 99203 OFFICE O/P NEW LOW 30 MIN: CPT | Performed by: INTERNAL MEDICINE

## 2022-10-17 RX ORDER — CYCLOBENZAPRINE HCL 10 MG
10 TABLET ORAL 3 TIMES DAILY PRN
Qty: 90 TABLET | Refills: 0 | Status: SHIPPED | OUTPATIENT
Start: 2022-10-17

## 2022-10-17 RX ORDER — GABAPENTIN 300 MG/1
300 CAPSULE ORAL 4 TIMES DAILY
Qty: 120 CAPSULE | Refills: 0 | Status: SHIPPED | OUTPATIENT
Start: 2022-10-17

## 2022-10-17 NOTE — PROGRESS NOTES
Assessment/Plan:    No problem-specific Assessment & Plan notes found for this encounter  Diagnoses and all orders for this visit:    Screening for cardiovascular condition    Encounter for screening mammogram for malignant neoplasm of breast  Comments:  dr Frankie Tellez [facility]   2022 mammogram    Screening for diabetes mellitus  -     Hemoglobin A1C; Future    Screening for colon cancer  Comments:  =due 8/2024    Abnormal GGT test  -     Gamma GT; Future  -     Antinuclear Antibodies (ANNA MARIE), IFA; Future    HPV in female  Comments:  routine gyn eval [facility]  hrt   menapause    Overweight  -     TSH, 3rd generation with Free T4 reflex; Future    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)  Comments:  -follow dermatology [facility] dr Jeannie Can  Orders:  -     CBC and differential; Future  -     Comprehensive metabolic panel; Future    Anxiety          Subjective:      Patient ID: Gilma Flores is a 46 y o  female  July 2024==4th covid booster   First shingles  rec 5th and second  On dec etoh      The following portions of the patient's history were reviewed and updated as appropriate: allergies, current medications, past family history, past medical history, past social history, past surgical history, and problem list     Review of Systems   Constitutional: Negative for activity change and fatigue  HENT: Negative for ear discharge, ear pain, rhinorrhea and sore throat  Eyes: Negative for pain and visual disturbance  Respiratory: Negative for cough and shortness of breath  Cardiovascular: Negative for chest pain and leg swelling  Gastrointestinal: Negative for abdominal pain, constipation and diarrhea  Endocrine: Negative for cold intolerance and polyuria  Genitourinary: Negative for flank pain and hematuria  Musculoskeletal: Negative for back pain and joint swelling  Skin: Negative for pallor and wound  Neurological: Negative for dizziness, seizures and speech difficulty  Psychiatric/Behavioral: Negative for confusion and hallucinations  Objective:      /72   Pulse 78   Temp 97 8 °F (36 6 °C)   Resp 12   Ht 5' 5" (1 651 m)   Wt 87 5 kg (193 lb)   SpO2 99%   BMI 32 12 kg/m²          Physical Exam  Vitals and nursing note reviewed  Constitutional:       General: She is not in acute distress  Appearance: Normal appearance  She is not ill-appearing  HENT:      Head: Normocephalic  Right Ear: External ear normal  There is no impacted cerumen  Left Ear: External ear normal  There is no impacted cerumen  Nose: No congestion or rhinorrhea  Mouth/Throat:      Pharynx: No posterior oropharyngeal erythema  Eyes:      General: No scleral icterus  Right eye: No discharge  Left eye: No discharge  Neck:      Vascular: No carotid bruit  Cardiovascular:      Rate and Rhythm: Normal rate and regular rhythm  Heart sounds: Normal heart sounds  No murmur heard  No friction rub  No gallop  Pulmonary:      Breath sounds: No wheezing or rhonchi  Abdominal:      General: There is no distension  Tenderness: There is no abdominal tenderness  There is no guarding  Musculoskeletal:         General: No swelling  Cervical back: No rigidity  Right lower leg: No edema  Left lower leg: No edema  Lymphadenopathy:      Cervical: No cervical adenopathy  Skin:     Coloration: Skin is not jaundiced  Neurological:      Mental Status: She is alert  Cranial Nerves: No cranial nerve deficit  Motor: No weakness        Coordination: Coordination normal    Psychiatric:         Mood and Affect: Mood normal

## 2022-11-11 DIAGNOSIS — M54.2 CERVICALGIA: ICD-10-CM

## 2022-11-11 RX ORDER — GABAPENTIN 300 MG/1
300 CAPSULE ORAL 4 TIMES DAILY
Qty: 120 CAPSULE | Refills: 0 | Status: SHIPPED | OUTPATIENT
Start: 2022-11-11

## 2022-11-11 RX ORDER — CYCLOBENZAPRINE HCL 10 MG
10 TABLET ORAL 3 TIMES DAILY PRN
Qty: 90 TABLET | Refills: 0 | Status: SHIPPED | OUTPATIENT
Start: 2022-11-11

## 2022-12-07 DIAGNOSIS — M54.2 CERVICALGIA: ICD-10-CM

## 2022-12-08 RX ORDER — GABAPENTIN 300 MG/1
300 CAPSULE ORAL 4 TIMES DAILY
Qty: 120 CAPSULE | Refills: 0 | Status: SHIPPED | OUTPATIENT
Start: 2022-12-08

## 2022-12-08 RX ORDER — CYCLOBENZAPRINE HCL 10 MG
10 TABLET ORAL 3 TIMES DAILY PRN
Qty: 90 TABLET | Refills: 0 | Status: SHIPPED | OUTPATIENT
Start: 2022-12-08

## 2022-12-16 PROBLEM — Z13.6 SCREENING FOR CARDIOVASCULAR CONDITION: Status: RESOLVED | Noted: 2022-10-17 | Resolved: 2022-12-16

## 2022-12-27 DIAGNOSIS — M54.2 CERVICALGIA: ICD-10-CM

## 2022-12-27 RX ORDER — GABAPENTIN 300 MG/1
300 CAPSULE ORAL 4 TIMES DAILY
Qty: 120 CAPSULE | Refills: 0 | Status: SHIPPED | OUTPATIENT
Start: 2022-12-27

## 2023-01-03 DIAGNOSIS — M54.2 CERVICALGIA: ICD-10-CM

## 2023-01-04 RX ORDER — CYCLOBENZAPRINE HCL 10 MG
10 TABLET ORAL 3 TIMES DAILY PRN
Qty: 90 TABLET | Refills: 0 | Status: SHIPPED | OUTPATIENT
Start: 2023-01-04

## 2023-01-23 DIAGNOSIS — M54.2 CERVICALGIA: ICD-10-CM

## 2023-01-23 RX ORDER — GABAPENTIN 300 MG/1
300 CAPSULE ORAL 4 TIMES DAILY
Qty: 120 CAPSULE | Refills: 0 | Status: CANCELLED | OUTPATIENT
Start: 2023-01-23

## 2023-01-30 DIAGNOSIS — M54.2 CERVICALGIA: ICD-10-CM

## 2023-01-30 LAB — HBA1C MFR BLD HPLC: 4.9 %

## 2023-01-30 RX ORDER — CYCLOBENZAPRINE HCL 10 MG
10 TABLET ORAL 3 TIMES DAILY PRN
Qty: 90 TABLET | Refills: 0 | Status: CANCELLED | OUTPATIENT
Start: 2023-01-30

## 2023-01-31 ENCOUNTER — OFFICE VISIT (OUTPATIENT)
Dept: INTERNAL MEDICINE CLINIC | Facility: CLINIC | Age: 53
End: 2023-01-31

## 2023-01-31 VITALS
HEART RATE: 74 BPM | SYSTOLIC BLOOD PRESSURE: 130 MMHG | DIASTOLIC BLOOD PRESSURE: 74 MMHG | WEIGHT: 198 LBS | BODY MASS INDEX: 32.99 KG/M2 | TEMPERATURE: 97.8 F | HEIGHT: 65 IN

## 2023-01-31 DIAGNOSIS — E66.3 OVERWEIGHT: ICD-10-CM

## 2023-01-31 DIAGNOSIS — C43.62 MALIGNANT MELANOMA OF LEFT UPPER EXTREMITY INCLUDING SHOULDER (HCC): ICD-10-CM

## 2023-01-31 DIAGNOSIS — R53.83 FATIGUE, UNSPECIFIED TYPE: ICD-10-CM

## 2023-01-31 DIAGNOSIS — Z13.6 SCREENING FOR CARDIOVASCULAR CONDITION: ICD-10-CM

## 2023-01-31 DIAGNOSIS — F41.9 ANXIETY: ICD-10-CM

## 2023-01-31 DIAGNOSIS — Z12.31 ENCOUNTER FOR SCREENING MAMMOGRAM FOR BREAST CANCER: ICD-10-CM

## 2023-01-31 DIAGNOSIS — M54.2 CERVICALGIA: ICD-10-CM

## 2023-01-31 DIAGNOSIS — Z13.1 SCREENING FOR DIABETES MELLITUS: ICD-10-CM

## 2023-01-31 DIAGNOSIS — Z00.00 ANNUAL PHYSICAL EXAM: Primary | ICD-10-CM

## 2023-01-31 DIAGNOSIS — G44.209 TENSION HEADACHE: ICD-10-CM

## 2023-01-31 RX ORDER — CYCLOBENZAPRINE HCL 10 MG
10 TABLET ORAL 3 TIMES DAILY PRN
Qty: 90 TABLET | Refills: 0 | Status: SHIPPED | OUTPATIENT
Start: 2023-01-31

## 2023-01-31 NOTE — PATIENT INSTRUCTIONS

## 2023-01-31 NOTE — PROGRESS NOTES
Labs ordered 10/2022  Done 23:  Elizabeth(pending)  tention in  Neck help flexeril  Off tylenol  lft nl  Discussed ozempic  Pt request      [address] [facility] INTERNAL MEDICINE    NAME: Rodolfo Prado  AGE: 45 y o  SEX: female  : 1970     DATE: 2023     Assessment and Plan:     Problem List Items Addressed This Visit        Other    Anxiety    Cervicalgia    Relevant Medications    cyclobenzaprine (FLEXERIL) 10 mg tablet    Melanoma ([address] )    Screening for cardiovascular condition - Primary    Relevant Orders    Lipid panel    Overweight    Fatigue    Relevant Orders    CBC and Platelet    Comprehensive metabolic panel    TSH, 3rd generation with Free T4 reflex    Tension headache    Relevant Medications    cyclobenzaprine (FLEXERIL) 10 mg tablet       Immunizations and preventive care screenings were discussed with patient today  Appropriate education was printed on patient's after visit summary  Counseling:  · Exercise: the importance of regular exercise/physical activity was discussed  Recommend exercise 3-5 times per week for at least 30 minutes  BMI Counseling: There is no height or weight on file to calculate BMI  The BMI is above normal  Nutrition recommendations include decreasing portion sizes  Exercise recommendations include exercising 3-5 times per week  Patient referred to PCP  Rationale for BMI follow-up plan is due to patient being overweight or obese  Return in 6 months (on 2023)  Chief Complaint:     Chief Complaint   Patient presents with   • Annual Exam      History of Present Illness:     Adult Annual Physical   Patient here for a comprehensive physical exam  The patient reports no problems  Diet and Physical Activity  · Diet/Nutrition: well balanced diet  · Exercise: walking  Depression Screening  PHQ-2/9 Depression Screening         General Health  · Sleep: sleeps well     · Hearing: normal - none   · Vision: no vision problems  · Dental: regular dental visits  /GYN Health  · Patient is:    · Last menstrual period:    · Contraceptive method:        Review of Systems:     Review of Systems   Constitutional: Negative for activity change, appetite change, chills, diaphoresis, fatigue and fever  HENT: Negative for congestion, facial swelling, hearing loss, mouth sores, rhinorrhea, sore throat, trouble swallowing and voice change  Eyes: Negative for photophobia and pain  Respiratory: Negative for apnea, cough, chest tightness, shortness of breath and stridor  Cardiovascular: Negative for chest pain, palpitations and leg swelling  Gastrointestinal: Negative for abdominal distention, abdominal pain, blood in stool and constipation  Endocrine: Negative for cold intolerance and heat intolerance  Genitourinary: Negative for difficulty urinating, dysuria, flank pain, genital sores, hematuria and urgency  Musculoskeletal: Negative for arthralgias, back pain, gait problem, joint swelling and myalgias  Skin: Negative for rash and wound  Allergic/Immunologic: Negative for environmental allergies, food allergies and immunocompromised state  Neurological: Negative for dizziness, tremors, seizures, syncope, facial asymmetry, speech difficulty, weakness, light-headedness, numbness and headaches  Hematological: Negative for adenopathy  Does not bruise/bleed easily  Psychiatric/Behavioral: Negative for agitation, behavioral problems, hallucinations, self-injury, sleep disturbance and suicidal ideas        Past Medical History:     Past Medical History:   Diagnosis Date   • Muscle spasms of neck    • Muscular abdominal pain in periumbilical region 0/82/5218   • Right calf pain 3/30/2018   • Swimmer's ear of left side 8/10/2018   • Tick bite     last assessed 5/5/15  documented resolved 5/5/16      Past Surgical History:     Past Surgical History:   Procedure Laterality Date   • WISDOM TOOTH EXTRACTION        Social History:     Social History     Socioeconomic History   • Marital status: /Civil Union     Spouse name: Not on file   • Number of children: Not on file   • Years of education: Not on file   • Highest education level: Not on file   Occupational History   • Not on file   Tobacco Use   • Smoking status: Never   • Smokeless tobacco: Never   Vaping Use   • Vaping Use: Never used   Substance and Sexual Activity   • Alcohol use: Yes     Comment: social   • Drug use: Never   • Sexual activity: Not on file   Other Topics Concern   • Not on file   Social History Narrative   • Not on file     Social Determinants of Health     Financial Resource Strain: Not on file   Food Insecurity: Not on file   Transportation Needs: Not on file   Physical Activity: Not on file   Stress: Not on file   Social Connections: Not on file   Intimate Partner Violence: Not on file   Housing Stability: Not on file      Family History:     Family History   Problem Relation Age of Onset   • No Known Problems Mother    • No Known Problems Father    • No Known Problems Son    • No Known Problems Daughter       Current Medications:     Current Outpatient Medications   Medication Sig Dispense Refill   • cyclobenzaprine (FLEXERIL) 10 mg tablet Take 1 tablet (10 mg total) by mouth 3 (three) times a day as needed for muscle spasms 90 tablet 0   • Estrogel 0 75 MG/1 25 GM (0 06%) topical gel APPLY ON THE SKIN DAILY     • gabapentin (NEURONTIN) 300 mg capsule Take 1 capsule (300 mg total) by mouth 4 (four) times a day 120 capsule 0   • medroxyPROGESTERone (PROVERA) 2 5 mg tablet Take 2 5 mg by mouth daily     • mometasone (ELOCON) 0 1 % cream as needed        No current facility-administered medications for this visit  Allergies:     No Known Allergies   Physical Exam:     Ht 5' 5" (1 651 m)   Wt 89 8 kg (198 lb)   BMI 32 95 kg/m²     Physical Exam  Constitutional:       General: She is not in acute distress  Appearance: Normal appearance  She is not ill-appearing or toxic-appearing  HENT:      Head: Normocephalic and atraumatic  Right Ear: Tympanic membrane and external ear normal       Left Ear: Tympanic membrane and external ear normal       Nose: Nose normal       Mouth/Throat:      Mouth: Mucous membranes are moist       Pharynx: Oropharynx is clear  Eyes:      General: No scleral icterus  Right eye: No discharge  Left eye: No discharge  Extraocular Movements: Extraocular movements intact  Conjunctiva/sclera: Conjunctivae normal       Pupils: Pupils are equal, round, and reactive to light  Neck:      Vascular: No carotid bruit  Cardiovascular:      Rate and Rhythm: Normal rate and regular rhythm  Pulses: Normal pulses  Heart sounds: No murmur heard  No friction rub  No gallop  Pulmonary:      Effort: Pulmonary effort is normal       Breath sounds: Normal breath sounds  No wheezing, rhonchi or rales  Abdominal:      General: Bowel sounds are normal  There is no distension  Palpations: Abdomen is soft  There is no mass  Tenderness: There is no guarding or rebound  Musculoskeletal:         General: No swelling  Cervical back: Normal range of motion and neck supple  No rigidity  Right lower leg: No edema  Left lower leg: No edema  Lymphadenopathy:      Cervical: No cervical adenopathy  Skin:     General: Skin is warm  Capillary Refill: Capillary refill takes less than 2 seconds  Coloration: Skin is not jaundiced  Findings: No rash  Neurological:      General: No focal deficit present  Mental Status: She is alert and oriented to person, place, and time  Cranial Nerves: No cranial nerve deficit  Sensory: No sensory deficit  Motor: No weakness        Gait: Gait normal       Deep Tendon Reflexes: Reflexes normal    Psychiatric:         Mood and Affect: Mood normal          Behavior: Behavior normal  Judgment: Judgment normal           Luisito Zacarias DO  [facility] INTERNAL MEDICINE

## 2023-02-24 DIAGNOSIS — M54.2 CERVICALGIA: ICD-10-CM

## 2023-02-24 RX ORDER — CYCLOBENZAPRINE HCL 10 MG
10 TABLET ORAL 3 TIMES DAILY PRN
Qty: 90 TABLET | Refills: 0 | Status: SHIPPED | OUTPATIENT
Start: 2023-02-24

## 2023-03-22 DIAGNOSIS — M54.2 CERVICALGIA: ICD-10-CM

## 2023-03-23 RX ORDER — CYCLOBENZAPRINE HCL 10 MG
10 TABLET ORAL 3 TIMES DAILY PRN
Qty: 90 TABLET | Refills: 0 | Status: SHIPPED | OUTPATIENT
Start: 2023-03-23

## 2023-04-01 PROBLEM — Z13.6 SCREENING FOR CARDIOVASCULAR CONDITION: Status: RESOLVED | Noted: 2022-10-17 | Resolved: 2023-04-01

## 2023-04-24 DIAGNOSIS — M54.2 CERVICALGIA: ICD-10-CM

## 2023-04-24 RX ORDER — CYCLOBENZAPRINE HCL 10 MG
TABLET ORAL
Qty: 90 TABLET | Refills: 0 | Status: SHIPPED | OUTPATIENT
Start: 2023-04-24

## 2023-05-18 DIAGNOSIS — M54.2 CERVICALGIA: ICD-10-CM

## 2023-05-18 RX ORDER — CYCLOBENZAPRINE HCL 10 MG
10 TABLET ORAL 3 TIMES DAILY PRN
Qty: 90 TABLET | Refills: 0 | Status: SHIPPED | OUTPATIENT
Start: 2023-05-18

## 2023-06-14 DIAGNOSIS — M54.2 CERVICALGIA: ICD-10-CM

## 2023-06-14 RX ORDER — CYCLOBENZAPRINE HCL 10 MG
10 TABLET ORAL 3 TIMES DAILY PRN
Qty: 90 TABLET | Refills: 0 | Status: SHIPPED | OUTPATIENT
Start: 2023-06-14

## 2023-07-11 DIAGNOSIS — M54.2 CERVICALGIA: ICD-10-CM

## 2023-07-11 RX ORDER — CYCLOBENZAPRINE HCL 10 MG
10 TABLET ORAL 3 TIMES DAILY PRN
Qty: 90 TABLET | Refills: 0 | Status: SHIPPED | OUTPATIENT
Start: 2023-07-11

## 2023-08-07 DIAGNOSIS — M54.2 CERVICALGIA: ICD-10-CM

## 2023-08-07 RX ORDER — CYCLOBENZAPRINE HCL 10 MG
10 TABLET ORAL 3 TIMES DAILY PRN
Qty: 90 TABLET | Refills: 0 | Status: SHIPPED | OUTPATIENT
Start: 2023-08-07

## 2023-09-06 DIAGNOSIS — M54.2 CERVICALGIA: ICD-10-CM

## 2023-09-06 RX ORDER — CYCLOBENZAPRINE HCL 10 MG
10 TABLET ORAL 3 TIMES DAILY PRN
Qty: 90 TABLET | Refills: 0 | Status: SHIPPED | OUTPATIENT
Start: 2023-09-06

## 2023-10-05 DIAGNOSIS — M54.2 CERVICALGIA: ICD-10-CM

## 2023-10-05 RX ORDER — CYCLOBENZAPRINE HCL 10 MG
10 TABLET ORAL 3 TIMES DAILY PRN
Qty: 90 TABLET | Refills: 0 | Status: CANCELLED | OUTPATIENT
Start: 2023-10-05

## 2023-10-06 ENCOUNTER — OFFICE VISIT (OUTPATIENT)
Dept: INTERNAL MEDICINE CLINIC | Facility: CLINIC | Age: 53
End: 2023-10-06
Payer: COMMERCIAL

## 2023-10-06 VITALS
WEIGHT: 169 LBS | HEART RATE: 70 BPM | DIASTOLIC BLOOD PRESSURE: 70 MMHG | RESPIRATION RATE: 12 BRPM | BODY MASS INDEX: 28.16 KG/M2 | SYSTOLIC BLOOD PRESSURE: 130 MMHG | OXYGEN SATURATION: 98 % | HEIGHT: 65 IN

## 2023-10-06 DIAGNOSIS — G44.209 TENSION HEADACHE: Primary | ICD-10-CM

## 2023-10-06 DIAGNOSIS — M54.2 CERVICALGIA: ICD-10-CM

## 2023-10-06 PROCEDURE — 99214 OFFICE O/P EST MOD 30 MIN: CPT | Performed by: INTERNAL MEDICINE

## 2023-10-06 RX ORDER — CYCLOBENZAPRINE HCL 10 MG
10 TABLET ORAL 3 TIMES DAILY PRN
Qty: 90 TABLET | Refills: 0 | Status: SHIPPED | OUTPATIENT
Start: 2023-10-06

## 2023-10-06 NOTE — PROGRESS NOTES
Assessment/Plan:    No problem-specific Assessment & Plan notes found for this encounter. Diagnoses and all orders for this visit:    Tension headache    Cervicalgia          Subjective:      Patient ID: Christi Borjas is a 45 y.o. female. [facility] [place]  Discussed hep c.b.a  covid flu  Anxiety and weeding=call when home  Get labs    Walks 4 miles a day          The following portions of the patient's history were reviewed and updated as appropriate: allergies, current medications, past family history, past medical history, past social history, past surgical history, and problem list.    Review of Systems   Constitutional: Negative for activity change and fatigue. HENT: Negative for ear discharge, ear pain, rhinorrhea and sore throat. Eyes: Negative for pain and visual disturbance. Respiratory: Negative for cough and shortness of breath. Cardiovascular: Negative for chest pain and leg swelling. Gastrointestinal: Negative for abdominal pain, constipation and diarrhea. Endocrine: Negative for cold intolerance and polyuria. Genitourinary: Negative for flank pain and hematuria. Musculoskeletal: Negative for back pain and joint swelling. Skin: Negative for pallor and wound. Neurological: Negative for dizziness, seizures and speech difficulty. Psychiatric/Behavioral: Negative for confusion and hallucinations. Objective:      /70   Pulse 70   Resp 12   Ht 5' 5" (1.651 m)   Wt 76.7 kg (169 lb)   SpO2 98%   BMI 28.12 kg/m²          Physical Exam  Vitals and nursing note reviewed. Constitutional:       General: She is not in acute distress. Appearance: Normal appearance. She is not ill-appearing. HENT:      Head: Normocephalic. Right Ear: External ear normal. There is no impacted cerumen. Left Ear: External ear normal. There is no impacted cerumen. Nose: No congestion or rhinorrhea.       Mouth/Throat:      Pharynx: No posterior oropharyngeal erythema. Eyes:      General: No scleral icterus. Right eye: No discharge. Left eye: No discharge. Neck:      Vascular: No carotid bruit. Cardiovascular:      Rate and Rhythm: Normal rate and regular rhythm. Heart sounds: Normal heart sounds. No murmur heard. No friction rub. No gallop. Pulmonary:      Breath sounds: No wheezing or rhonchi. Abdominal:      General: There is no distension. Tenderness: There is no abdominal tenderness. There is no guarding. Musculoskeletal:         General: No swelling. Cervical back: No rigidity. Right lower leg: No edema. Left lower leg: No edema. Lymphadenopathy:      Cervical: No cervical adenopathy. Skin:     Coloration: Skin is not jaundiced. Neurological:      Mental Status: She is alert. Cranial Nerves: No cranial nerve deficit. Motor: No weakness.       Coordination: Coordination normal.   Psychiatric:         Mood and Affect: Mood normal.

## 2023-10-23 DIAGNOSIS — M54.2 CERVICALGIA: ICD-10-CM

## 2023-10-23 RX ORDER — CYCLOBENZAPRINE HCL 10 MG
10 TABLET ORAL 3 TIMES DAILY PRN
Qty: 90 TABLET | Refills: 0 | Status: SHIPPED | OUTPATIENT
Start: 2023-10-23

## 2023-11-27 DIAGNOSIS — M54.2 CERVICALGIA: ICD-10-CM

## 2023-11-27 RX ORDER — CYCLOBENZAPRINE HCL 10 MG
10 TABLET ORAL 3 TIMES DAILY PRN
Qty: 90 TABLET | Refills: 0 | Status: SHIPPED | OUTPATIENT
Start: 2023-11-27

## 2023-11-28 DIAGNOSIS — Z12.31 ENCOUNTER FOR SCREENING MAMMOGRAM FOR BREAST CANCER: ICD-10-CM

## 2023-12-20 DIAGNOSIS — M54.2 CERVICALGIA: ICD-10-CM

## 2023-12-21 RX ORDER — CYCLOBENZAPRINE HCL 10 MG
10 TABLET ORAL 3 TIMES DAILY PRN
Qty: 90 TABLET | Refills: 0 | Status: SHIPPED | OUTPATIENT
Start: 2023-12-21

## 2024-01-24 DIAGNOSIS — M54.2 CERVICALGIA: ICD-10-CM

## 2024-01-24 RX ORDER — CYCLOBENZAPRINE HCL 10 MG
10 TABLET ORAL 3 TIMES DAILY PRN
Qty: 90 TABLET | Refills: 0 | Status: SHIPPED | OUTPATIENT
Start: 2024-01-24

## 2024-02-12 ENCOUNTER — TELEPHONE (OUTPATIENT)
Dept: INTERNAL MEDICINE CLINIC | Facility: CLINIC | Age: 54
End: 2024-02-12

## 2024-02-12 DIAGNOSIS — M54.2 CERVICALGIA: ICD-10-CM

## 2024-02-12 RX ORDER — CYCLOBENZAPRINE HCL 10 MG
10 TABLET ORAL 3 TIMES DAILY PRN
Qty: 21 TABLET | Refills: 0 | Status: SHIPPED | OUTPATIENT
Start: 2024-02-12 | End: 2024-02-19

## 2024-02-12 NOTE — TELEPHONE ENCOUNTER
Pt states she left her flexeril in [place] and wants to know if you would send 1 wk until she can go get it and she said will also make appt

## 2024-02-21 LAB — HBA1C MFR BLD HPLC: 4.8 %

## 2024-02-27 ENCOUNTER — OFFICE VISIT (OUTPATIENT)
Dept: INTERNAL MEDICINE CLINIC | Facility: CLINIC | Age: 54
End: 2024-02-27
Payer: COMMERCIAL

## 2024-02-27 VITALS
OXYGEN SATURATION: 98 % | RESPIRATION RATE: 12 BRPM | HEIGHT: 65 IN | DIASTOLIC BLOOD PRESSURE: 70 MMHG | WEIGHT: 171 LBS | SYSTOLIC BLOOD PRESSURE: 120 MMHG | BODY MASS INDEX: 28.49 KG/M2 | TEMPERATURE: 97.8 F

## 2024-02-27 DIAGNOSIS — M54.2 CERVICALGIA: ICD-10-CM

## 2024-02-27 DIAGNOSIS — R53.83 OTHER FATIGUE: ICD-10-CM

## 2024-02-27 DIAGNOSIS — M62.838 MUSCLE SPASMS OF NECK: Primary | ICD-10-CM

## 2024-02-27 PROCEDURE — 99213 OFFICE O/P EST LOW 20 MIN: CPT | Performed by: INTERNAL MEDICINE

## 2024-02-27 RX ORDER — CYCLOBENZAPRINE HCL 10 MG
10 TABLET ORAL 3 TIMES DAILY PRN
Qty: 90 TABLET | Refills: 2 | Status: SHIPPED | OUTPATIENT
Start: 2024-02-27 | End: 2024-05-27

## 2024-02-27 RX ORDER — ONDANSETRON 4 MG/1
4 TABLET, FILM COATED ORAL EVERY 6 HOURS PRN
COMMUNITY
Start: 2024-01-02

## 2024-02-27 NOTE — PROGRESS NOTES
"  Tobacco Cessation Counseling: Tobacco cessation counseling was not provided. The patient is sincerely urged to quit consumption of tobacco. She is not ready to quit tobacco.         Assessment/Plan:    No problem-specific Assessment & Plan notes found for this encounter.       Diagnoses and all orders for this visit:    Muscle spasms of neck    Other fatigue    Other orders  -     ondansetron (ZOFRAN) 4 mg tablet; Take 4 mg by mouth every 6 (six) hours as needed for nausea          Subjective:      Patient ID: Rhonda Powell is a 53 y.o. female.    Labs 2/2024 reviewed  Piloties  and accupuncture---discussed  botox  and lidocain==\"not ready\"        The following portions of the patient's history were reviewed and updated as appropriate: allergies, current medications, past family history, past medical history, past social history, past surgical history, and problem list.    Review of Systems   Constitutional:  Negative for activity change and fatigue.   HENT:  Negative for ear discharge, ear pain, rhinorrhea and sore throat.    Eyes:  Negative for pain and visual disturbance.   Respiratory:  Negative for cough and shortness of breath.    Cardiovascular:  Negative for chest pain and leg swelling.   Gastrointestinal:  Negative for abdominal pain, constipation and diarrhea.   Endocrine: Negative for cold intolerance and polyuria.   Genitourinary:  Negative for flank pain and hematuria.   Musculoskeletal:  Negative for back pain and joint swelling.   Skin:  Negative for pallor and wound.   Neurological:  Negative for dizziness, seizures and speech difficulty.   Psychiatric/Behavioral:  Negative for confusion and hallucinations.          Objective:      There were no vitals taken for this visit.         Physical Exam  Vitals and nursing note reviewed.   Constitutional:       General: She is not in acute distress.     Appearance: Normal appearance. She is not ill-appearing.   HENT:      Head: Normocephalic.      Right " Ear: External ear normal. There is no impacted cerumen.      Left Ear: External ear normal. There is no impacted cerumen.      Nose: No congestion or rhinorrhea.      Mouth/Throat:      Pharynx: No posterior oropharyngeal erythema.   Eyes:      General: No scleral icterus.        Right eye: No discharge.         Left eye: No discharge.   Neck:      Vascular: No carotid bruit.   Cardiovascular:      Rate and Rhythm: Normal rate and regular rhythm.      Heart sounds: Normal heart sounds. No murmur heard.     No friction rub. No gallop.   Pulmonary:      Breath sounds: No wheezing or rhonchi.   Abdominal:      General: There is no distension.      Tenderness: There is no abdominal tenderness. There is no guarding.   Musculoskeletal:         General: No swelling.      Cervical back: No rigidity.      Right lower leg: No edema.      Left lower leg: No edema.   Lymphadenopathy:      Cervical: No cervical adenopathy.   Skin:     Coloration: Skin is not jaundiced.   Neurological:      Mental Status: She is alert.      Cranial Nerves: No cranial nerve deficit.      Motor: No weakness.      Coordination: Coordination normal.   Psychiatric:         Mood and Affect: Mood normal.

## 2024-02-29 ENCOUNTER — TELEPHONE (OUTPATIENT)
Dept: INTERNAL MEDICINE CLINIC | Facility: CLINIC | Age: 54
End: 2024-02-29

## 2024-05-09 ENCOUNTER — OFFICE VISIT (OUTPATIENT)
Dept: INTERNAL MEDICINE CLINIC | Facility: CLINIC | Age: 54
End: 2024-05-09
Payer: COMMERCIAL

## 2024-05-09 VITALS
HEART RATE: 78 BPM | HEIGHT: 65 IN | DIASTOLIC BLOOD PRESSURE: 88 MMHG | WEIGHT: 172 LBS | BODY MASS INDEX: 28.66 KG/M2 | SYSTOLIC BLOOD PRESSURE: 188 MMHG

## 2024-05-09 DIAGNOSIS — M62.838 MUSCLE SPASMS OF NECK: ICD-10-CM

## 2024-05-09 DIAGNOSIS — Z78.0 MENOPAUSE: ICD-10-CM

## 2024-05-09 DIAGNOSIS — M54.2 CERVICALGIA: Primary | ICD-10-CM

## 2024-05-09 DIAGNOSIS — I10 PRIMARY HYPERTENSION: ICD-10-CM

## 2024-05-09 DIAGNOSIS — F41.9 ANXIETY: ICD-10-CM

## 2024-05-09 PROCEDURE — 99213 OFFICE O/P EST LOW 20 MIN: CPT | Performed by: INTERNAL MEDICINE

## 2024-05-09 RX ORDER — CYCLOBENZAPRINE HCL 10 MG
10 TABLET ORAL
Qty: 90 TABLET | Refills: 2 | Status: SHIPPED | OUTPATIENT
Start: 2024-05-09 | End: 2025-02-03

## 2024-05-09 RX ORDER — PROPRANOLOL HYDROCHLORIDE 80 MG/1
80 CAPSULE, EXTENDED RELEASE ORAL DAILY
Qty: 30 CAPSULE | Refills: 1 | Status: SHIPPED | OUTPATIENT
Start: 2024-05-09 | End: 2024-07-08

## 2024-05-09 NOTE — PROGRESS NOTES
"     Assessment/Plan:    No problem-specific Assessment & Plan notes found for this encounter.       There are no diagnoses linked to this encounter.      Subjective:      Patient ID: Rhonda Powell is a 53 y.o. female.    Hx ha/cervical <2012    OV 2/2024:  Piloties  and accupuncture---discussed  botox  and lidocain==\"not ready\"      Pulsitile tinitis  both ears  Wooshing sound    Cool feet    Labs  reviewed                 The following portions of the patient's history were reviewed and updated as appropriate: allergies, current medications, past family history, past medical history, past social history, past surgical history, and problem list.    Review of Systems   Constitutional:  Negative for activity change and fatigue.   HENT:  Negative for ear discharge, ear pain, rhinorrhea and sore throat.    Eyes:  Negative for pain and visual disturbance.   Respiratory:  Negative for cough and shortness of breath.    Cardiovascular:  Negative for chest pain and leg swelling.   Gastrointestinal:  Negative for abdominal pain, constipation and diarrhea.   Endocrine: Negative for cold intolerance and polyuria.   Genitourinary:  Negative for flank pain and hematuria.   Musculoskeletal:  Negative for back pain and joint swelling.   Skin:  Negative for pallor and wound.   Neurological:  Positive for headaches. Negative for dizziness, seizures and speech difficulty.   Psychiatric/Behavioral:  Negative for confusion and hallucinations.          Objective:      There were no vitals taken for this visit.         Physical Exam  Vitals and nursing note reviewed.   Constitutional:       General: She is not in acute distress.     Appearance: Normal appearance. She is not ill-appearing.   HENT:      Head: Normocephalic.      Right Ear: External ear normal. There is no impacted cerumen.      Left Ear: External ear normal. There is no impacted cerumen.      Nose: No congestion or rhinorrhea.      Mouth/Throat:      Pharynx: No posterior " oropharyngeal erythema.   Eyes:      General: No scleral icterus.        Right eye: No discharge.         Left eye: No discharge.   Neck:      Vascular: No carotid bruit.   Cardiovascular:      Rate and Rhythm: Normal rate and regular rhythm.      Heart sounds: Normal heart sounds. No murmur heard.     No friction rub. No gallop.   Pulmonary:      Breath sounds: No wheezing or rhonchi.   Abdominal:      General: There is no distension.      Tenderness: There is no abdominal tenderness. There is no guarding.   Musculoskeletal:         General: No swelling.      Cervical back: No rigidity.      Right lower leg: No edema.      Left lower leg: No edema.   Lymphadenopathy:      Cervical: No cervical adenopathy.   Skin:     Coloration: Skin is not jaundiced.   Neurological:      Mental Status: She is alert.      Cranial Nerves: No cranial nerve deficit.      Motor: No weakness.      Coordination: Coordination normal.   Psychiatric:         Mood and Affect: Mood normal.

## 2024-05-31 DIAGNOSIS — I10 PRIMARY HYPERTENSION: ICD-10-CM

## 2024-05-31 DIAGNOSIS — F41.9 ANXIETY: ICD-10-CM

## 2024-05-31 RX ORDER — PROPRANOLOL HYDROCHLORIDE 80 MG/1
80 CAPSULE, EXTENDED RELEASE ORAL DAILY
Qty: 90 CAPSULE | Refills: 1 | Status: SHIPPED | OUTPATIENT
Start: 2024-05-31

## 2024-06-10 ENCOUNTER — TELEPHONE (OUTPATIENT)
Age: 54
End: 2024-06-10

## 2024-06-10 NOTE — TELEPHONE ENCOUNTER
Patient had called in stating that her medication cyclobenzaprine (FLEXERIL) 10 mg tablet  Is set to be taking one pill at bedtime, but patient stated it needed to be changed to 3 pills at bedtime.     Please advise patient on the medication change that is being requested, for any additional questions or concerns.

## 2024-06-11 ENCOUNTER — OFFICE VISIT (OUTPATIENT)
Dept: INTERNAL MEDICINE CLINIC | Facility: CLINIC | Age: 54
End: 2024-06-11
Payer: COMMERCIAL

## 2024-06-11 VITALS
HEIGHT: 65 IN | WEIGHT: 177 LBS | SYSTOLIC BLOOD PRESSURE: 155 MMHG | DIASTOLIC BLOOD PRESSURE: 77 MMHG | HEART RATE: 80 BPM | BODY MASS INDEX: 29.49 KG/M2

## 2024-06-11 DIAGNOSIS — I10 PRIMARY HYPERTENSION: Primary | ICD-10-CM

## 2024-06-11 DIAGNOSIS — H93.13 EAR NOISE/BUZZING, BILATERAL: ICD-10-CM

## 2024-06-11 DIAGNOSIS — M54.2 CERVICALGIA: ICD-10-CM

## 2024-06-11 PROCEDURE — 99213 OFFICE O/P EST LOW 20 MIN: CPT | Performed by: INTERNAL MEDICINE

## 2024-06-11 RX ORDER — PROPRANOLOL HYDROCHLORIDE 120 MG/1
120 CAPSULE, EXTENDED RELEASE ORAL DAILY
Qty: 30 CAPSULE | Refills: 2 | Status: SHIPPED | OUTPATIENT
Start: 2024-06-11

## 2024-06-11 RX ORDER — CYCLOBENZAPRINE HCL 10 MG
10 TABLET ORAL 3 TIMES DAILY PRN
Qty: 90 TABLET | Refills: 2 | Status: SHIPPED | OUTPATIENT
Start: 2024-06-11 | End: 2024-09-09

## 2024-06-11 NOTE — PROGRESS NOTES
Assessment/Plan:    No problem-specific Assessment & Plan notes found for this encounter.       There are no diagnoses linked to this encounter.      Subjective:      Patient ID: Rhonda Powell is a 53 y.o. female.    1. Bp on inderol 80 la  --132/62  and 122/80  74    2. Flexeril ?change takes 30mg hs    Hr  apple  75 average    Inderal helps anxiety    Labs 2/2024            The following portions of the patient's history were reviewed and updated as appropriate: allergies, current medications, past family history, past medical history, past social history, past surgical history, and problem list.    Review of Systems   Constitutional:  Negative for activity change and fatigue.   HENT:  Negative for ear discharge, ear pain, rhinorrhea and sore throat.    Eyes:  Negative for pain and visual disturbance.   Respiratory:  Negative for cough and shortness of breath.    Cardiovascular:  Negative for chest pain and leg swelling.   Gastrointestinal:  Negative for abdominal pain, constipation and diarrhea.   Endocrine: Negative for cold intolerance and polyuria.   Genitourinary:  Negative for flank pain and hematuria.   Musculoskeletal:  Negative for back pain and joint swelling.   Skin:  Negative for pallor and wound.   Neurological:  Negative for dizziness, seizures and speech difficulty.   Psychiatric/Behavioral:  Negative for confusion and hallucinations.          Objective:      There were no vitals taken for this visit.         Physical Exam  Vitals and nursing note reviewed.   Constitutional:       General: She is not in acute distress.     Appearance: Normal appearance. She is not ill-appearing.   HENT:      Head: Normocephalic.      Right Ear: External ear normal. There is no impacted cerumen.      Left Ear: External ear normal. There is no impacted cerumen.      Nose: No congestion or rhinorrhea.      Mouth/Throat:      Pharynx: No posterior oropharyngeal erythema.   Eyes:      General: No scleral icterus.         Right eye: No discharge.         Left eye: No discharge.   Neck:      Vascular: No carotid bruit.   Cardiovascular:      Rate and Rhythm: Normal rate and regular rhythm.      Heart sounds: Normal heart sounds. No murmur heard.     No friction rub. No gallop.   Pulmonary:      Breath sounds: No wheezing or rhonchi.   Abdominal:      General: There is no distension.      Tenderness: There is no abdominal tenderness. There is no guarding.   Musculoskeletal:         General: No swelling.      Cervical back: No rigidity.      Right lower leg: No edema.      Left lower leg: No edema.   Lymphadenopathy:      Cervical: No cervical adenopathy.   Skin:     Coloration: Skin is not jaundiced.   Neurological:      Mental Status: She is alert.      Cranial Nerves: No cranial nerve deficit.      Motor: No weakness.      Coordination: Coordination normal.   Psychiatric:         Mood and Affect: Mood normal.

## 2024-07-04 DIAGNOSIS — I10 PRIMARY HYPERTENSION: ICD-10-CM

## 2024-07-05 RX ORDER — PROPRANOLOL HYDROCHLORIDE 120 MG/1
120 CAPSULE, EXTENDED RELEASE ORAL DAILY
Qty: 100 CAPSULE | Refills: 1 | Status: SHIPPED | OUTPATIENT
Start: 2024-07-05

## 2024-07-23 ENCOUNTER — TELEPHONE (OUTPATIENT)
Age: 54
End: 2024-07-23

## 2024-07-23 NOTE — TELEPHONE ENCOUNTER
Josseline from ENT is calling to request a fax with the patients latest blood work, please fax to  Fax [phone]

## 2024-07-30 ENCOUNTER — HOSPITAL ENCOUNTER (OUTPATIENT)
Dept: HOSPITAL 99 - RAD | Age: 54
End: 2024-07-30
Payer: COMMERCIAL

## 2024-07-30 DIAGNOSIS — H93.A1: Primary | ICD-10-CM

## 2024-08-20 ENCOUNTER — HOSPITAL ENCOUNTER (OUTPATIENT)
Dept: HOSPITAL 99 - WDC | Age: 54
End: 2024-08-20
Payer: COMMERCIAL

## 2024-08-20 DIAGNOSIS — Z12.31: ICD-10-CM

## 2024-08-20 DIAGNOSIS — R92.2: Primary | ICD-10-CM

## 2024-08-25 DIAGNOSIS — M54.2 CERVICALGIA: ICD-10-CM

## 2024-08-27 DIAGNOSIS — M54.2 CERVICALGIA: ICD-10-CM

## 2024-08-27 NOTE — TELEPHONE ENCOUNTER
Medication: cyclobenzaprine (FLEXERIL) 10 mg tablet     Dose/Frequency: Take 1 tablet (10 mg total) by mouth 3 (three) times a day as needed for muscle spasms    Quantity: 90 tablet    Pharmacy:   [facility] #[id number] - [place] - [id number]  [id number], [place] [address]      Office:   [x] PCP/Provider -   [] Speciality/Provider -     Does the patient have enough for 3 days?   [] Yes   [x] No - Send as HP to POD

## 2024-08-28 DIAGNOSIS — M54.2 CERVICALGIA: ICD-10-CM

## 2024-08-28 RX ORDER — CYCLOBENZAPRINE HCL 10 MG
10 TABLET ORAL 3 TIMES DAILY PRN
Qty: 90 TABLET | Refills: 2 | OUTPATIENT
Start: 2024-08-28 | End: 2024-11-26

## 2024-08-28 NOTE — TELEPHONE ENCOUNTER
Patient called in regards to refill request for cyclobenzaprine.  Informed waiting for provider.

## 2024-08-28 NOTE — TELEPHONE ENCOUNTER
Patient called because she said she received a message that the Cyclobenzaprine rx was denied. She is going on vacation and is asking if someone can sign the prescription before her provider get back.

## 2024-08-29 RX ORDER — CYCLOBENZAPRINE HCL 10 MG
10 TABLET ORAL 3 TIMES DAILY PRN
Qty: 90 TABLET | Refills: 0 | Status: SHIPPED | OUTPATIENT
Start: 2024-08-29 | End: 2024-11-27

## 2024-09-02 RX ORDER — CYCLOBENZAPRINE HCL 10 MG
10 TABLET ORAL 3 TIMES DAILY PRN
Qty: 90 TABLET | Refills: 2 | Status: SHIPPED | OUTPATIENT
Start: 2024-09-02

## 2024-10-23 ENCOUNTER — HOSPITAL ENCOUNTER (OUTPATIENT)
Dept: HOSPITAL 99 - WDC | Age: 54
End: 2024-10-23
Payer: COMMERCIAL

## 2024-10-23 DIAGNOSIS — R92.2: Primary | ICD-10-CM

## 2024-10-31 DIAGNOSIS — M54.2 CERVICALGIA: ICD-10-CM

## 2024-10-31 RX ORDER — CYCLOBENZAPRINE HCL 10 MG
10 TABLET ORAL 3 TIMES DAILY PRN
Qty: 90 TABLET | Refills: 0 | Status: SHIPPED | OUTPATIENT
Start: 2024-10-31 | End: 2025-01-29

## 2024-11-21 DIAGNOSIS — I10 PRIMARY HYPERTENSION: ICD-10-CM

## 2024-11-21 DIAGNOSIS — M54.2 CERVICALGIA: ICD-10-CM

## 2024-11-21 RX ORDER — CYCLOBENZAPRINE HCL 10 MG
10 TABLET ORAL 3 TIMES DAILY PRN
Qty: 90 TABLET | Refills: 0 | Status: SHIPPED | OUTPATIENT
Start: 2024-11-21

## 2024-11-21 RX ORDER — PROPRANOLOL HYDROCHLORIDE 120 MG/1
120 CAPSULE, EXTENDED RELEASE ORAL DAILY
Qty: 90 CAPSULE | Refills: 1 | Status: SHIPPED | OUTPATIENT
Start: 2024-11-21

## 2025-01-08 DIAGNOSIS — M54.2 CERVICALGIA: ICD-10-CM

## 2025-01-09 RX ORDER — CYCLOBENZAPRINE HCL 10 MG
10 TABLET ORAL 3 TIMES DAILY PRN
Qty: 90 TABLET | Refills: 0 | Status: SHIPPED | OUTPATIENT
Start: 2025-01-09

## 2025-01-29 DIAGNOSIS — M54.2 CERVICALGIA: ICD-10-CM

## 2025-01-30 DIAGNOSIS — M54.2 CERVICALGIA: ICD-10-CM

## 2025-01-30 RX ORDER — CYCLOBENZAPRINE HCL 10 MG
10 TABLET ORAL 3 TIMES DAILY PRN
Qty: 90 TABLET | Refills: 0 | Status: SHIPPED | OUTPATIENT
Start: 2025-01-30 | End: 2025-04-30

## 2025-01-30 RX ORDER — CYCLOBENZAPRINE HCL 10 MG
10 TABLET ORAL 3 TIMES DAILY PRN
Qty: 90 TABLET | Refills: 0 | Status: SHIPPED | OUTPATIENT
Start: 2025-01-30

## 2025-02-05 ENCOUNTER — OFFICE VISIT (OUTPATIENT)
Dept: INTERNAL MEDICINE CLINIC | Facility: CLINIC | Age: 55
End: 2025-02-05
Payer: COMMERCIAL

## 2025-02-05 VITALS — DIASTOLIC BLOOD PRESSURE: 60 MMHG | SYSTOLIC BLOOD PRESSURE: 118 MMHG | HEART RATE: 78 BPM | TEMPERATURE: 97.8 F

## 2025-02-05 DIAGNOSIS — Z12.4 SCREENING FOR CERVICAL CANCER: ICD-10-CM

## 2025-02-05 DIAGNOSIS — Z12.11 SCREENING FOR COLON CANCER: ICD-10-CM

## 2025-02-05 DIAGNOSIS — F41.9 ANXIETY: ICD-10-CM

## 2025-02-05 DIAGNOSIS — J22 LOWER RESP. TRACT INFECTION: Primary | ICD-10-CM

## 2025-02-05 DIAGNOSIS — I10 PRIMARY HYPERTENSION: ICD-10-CM

## 2025-02-05 PROCEDURE — 99213 OFFICE O/P EST LOW 20 MIN: CPT | Performed by: INTERNAL MEDICINE

## 2025-02-05 RX ORDER — AMOXICILLIN 500 MG/1
500 CAPSULE ORAL EVERY 8 HOURS SCHEDULED
Qty: 24 CAPSULE | Refills: 0 | Status: SHIPPED | OUTPATIENT
Start: 2025-02-05 | End: 2025-02-13

## 2025-02-05 RX ORDER — BENZONATATE 100 MG/1
100 CAPSULE ORAL 3 TIMES DAILY PRN
Qty: 20 CAPSULE | Refills: 0 | Status: SHIPPED | OUTPATIENT
Start: 2025-02-05

## 2025-02-05 NOTE — PROGRESS NOTES
Assessment/Plan:    No problem-specific Assessment & Plan notes found for this encounter.       There are no diagnoses linked to this encounter.      Subjective:      Patient ID: Rhonda Powell is a 54 y.o. female.    Day ill=10  Cough  No fever  Green mucus  Bp home ok    Machine pk=548  vs  118  twice        The following portions of the patient's history were reviewed and updated as appropriate: allergies, current medications, past family history, past medical history, past social history, past surgical history, and problem list.    Review of Systems   Constitutional:  Negative for activity change and fatigue.   HENT:  Negative for ear discharge, ear pain, rhinorrhea and sore throat.    Eyes:  Negative for pain and visual disturbance.   Respiratory:  Negative for cough and shortness of breath.    Cardiovascular:  Negative for chest pain and leg swelling.   Gastrointestinal:  Negative for abdominal pain, constipation and diarrhea.   Endocrine: Negative for cold intolerance and polyuria.   Genitourinary:  Negative for flank pain and hematuria.   Musculoskeletal:  Negative for back pain and joint swelling.   Skin:  Negative for pallor and wound.   Neurological:  Negative for dizziness, seizures and speech difficulty.   Psychiatric/Behavioral:  Negative for confusion and hallucinations.          Objective:      There were no vitals taken for this visit.         Physical Exam  Vitals and nursing note reviewed.   Constitutional:       General: She is not in acute distress.     Appearance: Normal appearance. She is not ill-appearing.   HENT:      Head: Normocephalic.      Right Ear: External ear normal. There is no impacted cerumen.      Left Ear: External ear normal. There is no impacted cerumen.      Nose: No congestion or rhinorrhea.      Mouth/Throat:      Pharynx: No posterior oropharyngeal erythema.   Eyes:      General: No scleral icterus.        Right eye: No discharge.         Left eye: No discharge.    Neck:      Vascular: No carotid bruit.   Cardiovascular:      Rate and Rhythm: Normal rate and regular rhythm.      Heart sounds: Normal heart sounds. No murmur heard.     No friction rub. No gallop.   Pulmonary:      Breath sounds: No wheezing or rhonchi.   Abdominal:      General: There is no distension.      Tenderness: There is no abdominal tenderness. There is no guarding.   Musculoskeletal:         General: No swelling.      Cervical back: No rigidity.      Right lower leg: No edema.      Left lower leg: No edema.   Lymphadenopathy:      Cervical: No cervical adenopathy.   Skin:     Coloration: Skin is not jaundiced.   Neurological:      Mental Status: She is alert.      Cranial Nerves: No cranial nerve deficit.      Motor: No weakness.      Coordination: Coordination normal.   Psychiatric:         Mood and Affect: Mood normal.

## 2025-03-07 PROBLEM — Z12.11 SCREENING FOR COLON CANCER: Status: RESOLVED | Noted: 2022-10-17 | Resolved: 2025-03-07

## 2025-03-07 PROBLEM — Z12.4 SCREENING FOR CERVICAL CANCER: Status: RESOLVED | Noted: 2025-02-05 | Resolved: 2025-03-07

## 2025-03-12 DIAGNOSIS — M54.2 CERVICALGIA: ICD-10-CM

## 2025-03-13 RX ORDER — CYCLOBENZAPRINE HCL 10 MG
10 TABLET ORAL 3 TIMES DAILY PRN
Qty: 90 TABLET | Refills: 0 | Status: SHIPPED | OUTPATIENT
Start: 2025-03-13

## 2025-03-27 LAB — COLOGUARD RESULT REPORTABLE: NEGATIVE

## 2025-04-08 DIAGNOSIS — M54.2 CERVICALGIA: ICD-10-CM

## 2025-04-09 RX ORDER — CYCLOBENZAPRINE HCL 10 MG
10 TABLET ORAL 3 TIMES DAILY PRN
Qty: 90 TABLET | Refills: 0 | Status: SHIPPED | OUTPATIENT
Start: 2025-04-09

## 2025-05-07 DIAGNOSIS — M54.2 CERVICALGIA: ICD-10-CM

## 2025-05-07 RX ORDER — CYCLOBENZAPRINE HCL 10 MG
10 TABLET ORAL 3 TIMES DAILY PRN
Qty: 90 TABLET | Refills: 0 | Status: SHIPPED | OUTPATIENT
Start: 2025-05-07

## 2025-05-22 ENCOUNTER — HOSPITAL ENCOUNTER (OUTPATIENT)
Dept: HOSPITAL 99 - WDC | Age: 55
End: 2025-05-22
Payer: COMMERCIAL

## 2025-05-22 DIAGNOSIS — R92.8: Primary | ICD-10-CM

## 2025-05-23 DIAGNOSIS — I10 PRIMARY HYPERTENSION: ICD-10-CM

## 2025-05-27 RX ORDER — PROPRANOLOL HYDROCHLORIDE 120 MG/1
120 CAPSULE, EXTENDED RELEASE ORAL DAILY
Qty: 90 CAPSULE | Refills: 1 | Status: SHIPPED | OUTPATIENT
Start: 2025-05-27

## 2025-06-01 DIAGNOSIS — M54.2 CERVICALGIA: ICD-10-CM

## 2025-06-02 RX ORDER — CYCLOBENZAPRINE HCL 10 MG
10 TABLET ORAL 3 TIMES DAILY PRN
Qty: 90 TABLET | Refills: 0 | Status: SHIPPED | OUTPATIENT
Start: 2025-06-02

## 2025-06-26 DIAGNOSIS — M54.2 CERVICALGIA: ICD-10-CM

## 2025-06-27 DIAGNOSIS — M54.2 CERVICALGIA: ICD-10-CM

## 2025-06-27 RX ORDER — CYCLOBENZAPRINE HCL 10 MG
10 TABLET ORAL 3 TIMES DAILY PRN
Qty: 90 TABLET | Refills: 0 | Status: CANCELLED | OUTPATIENT
Start: 2025-06-27

## 2025-06-27 NOTE — TELEPHONE ENCOUNTER
Patient called to request a refill for their flexeril advised a refill was requested on 6/27/2025 and is pending approval. Patient verbalized understanding and is in agreement.     Does the patient have enough for 3 days?   [] Yes   [x] No - Send as HP to POD

## 2025-06-27 NOTE — TELEPHONE ENCOUNTER
Patient called to request a refill for their cyclobenzaprine 10 mg  advised a refill was requested on 06/27/25 and is pending approval. Patient verbalized understanding and is in agreement.   Patient called for pharmacy change also   Does the patient have enough for 3 days?   [] Yes   [x] No - Send as HP to POD

## 2025-06-29 RX ORDER — CYCLOBENZAPRINE HCL 10 MG
10 TABLET ORAL 3 TIMES DAILY PRN
Qty: 90 TABLET | Refills: 0 | Status: SHIPPED | OUTPATIENT
Start: 2025-06-29

## 2025-06-29 RX ORDER — CYCLOBENZAPRINE HCL 10 MG
10 TABLET ORAL 3 TIMES DAILY PRN
Qty: 90 TABLET | Refills: 0 | OUTPATIENT
Start: 2025-06-29

## 2025-07-24 ENCOUNTER — TELEPHONE (OUTPATIENT)
Dept: INTERNAL MEDICINE CLINIC | Facility: CLINIC | Age: 55
End: 2025-07-24

## 2025-07-24 ENCOUNTER — DOCUMENTATION (OUTPATIENT)
Dept: ADMINISTRATIVE | Facility: OTHER | Age: 55
End: 2025-07-24

## 2025-07-29 DIAGNOSIS — M54.2 CERVICALGIA: ICD-10-CM

## 2025-07-30 RX ORDER — CYCLOBENZAPRINE HCL 10 MG
10 TABLET ORAL 3 TIMES DAILY PRN
Qty: 90 TABLET | Refills: 0 | Status: SHIPPED | OUTPATIENT
Start: 2025-07-30